# Patient Record
Sex: MALE | Race: WHITE | Employment: OTHER | URBAN - METROPOLITAN AREA
[De-identification: names, ages, dates, MRNs, and addresses within clinical notes are randomized per-mention and may not be internally consistent; named-entity substitution may affect disease eponyms.]

---

## 2021-01-01 ENCOUNTER — APPOINTMENT (OUTPATIENT)
Dept: GENERAL RADIOLOGY | Age: 56
DRG: 917 | End: 2021-01-01
Attending: FAMILY MEDICINE
Payer: MEDICARE

## 2021-01-01 ENCOUNTER — APPOINTMENT (OUTPATIENT)
Dept: CT IMAGING | Age: 56
DRG: 917 | End: 2021-01-01
Attending: FAMILY MEDICINE
Payer: MEDICARE

## 2021-01-01 ENCOUNTER — APPOINTMENT (OUTPATIENT)
Dept: NEUROLOGY | Age: 56
DRG: 917 | End: 2021-01-01
Attending: FAMILY MEDICINE
Payer: MEDICARE

## 2021-01-01 ENCOUNTER — HOSPITAL ENCOUNTER (INPATIENT)
Age: 56
LOS: 2 days | DRG: 917 | End: 2021-05-11
Attending: FAMILY MEDICINE | Admitting: FAMILY MEDICINE
Payer: MEDICARE

## 2021-01-01 ENCOUNTER — APPOINTMENT (OUTPATIENT)
Dept: ULTRASOUND IMAGING | Age: 56
DRG: 917 | End: 2021-01-01
Attending: FAMILY MEDICINE
Payer: MEDICARE

## 2021-01-01 VITALS
WEIGHT: 167.2 LBS | OXYGEN SATURATION: 92 % | SYSTOLIC BLOOD PRESSURE: 116 MMHG | TEMPERATURE: 95.9 F | BODY MASS INDEX: 22.65 KG/M2 | RESPIRATION RATE: 17 BRPM | DIASTOLIC BLOOD PRESSURE: 89 MMHG | HEIGHT: 72 IN | HEART RATE: 121 BPM

## 2021-01-01 DIAGNOSIS — N17.9 ACUTE RENAL FAILURE, UNSPECIFIED ACUTE RENAL FAILURE TYPE (HCC): Primary | ICD-10-CM

## 2021-01-01 LAB
AADO2: 375.8 MMHG
AADO2: 411.6 MMHG
AADO2: 413.4 MMHG
AADO2: 415.3 MMHG
AADO2: 498.3 MMHG
AADO2: 528.3 MMHG
AADO2: 541 MMHG
AADO2: 551.3 MMHG
AADO2: 551.9 MMHG
AADO2: 552.8 MMHG
AADO2: 553 MMHG
AADO2: 554.5 MMHG
AADO2: 561.9 MMHG
ABO/RH: NORMAL
ACANTHOCYTES: ABNORMAL
ACETAMINOPHEN LEVEL: <5 MCG/ML (ref 10–30)
ALBUMIN SERPL-MCNC: 1.7 G/DL (ref 3.5–5.2)
ALBUMIN SERPL-MCNC: 1.8 G/DL (ref 3.5–5.2)
ALBUMIN SERPL-MCNC: 2 G/DL (ref 3.5–5.2)
ALBUMIN SERPL-MCNC: 2.3 G/DL (ref 3.5–5.2)
ALBUMIN SERPL-MCNC: 2.3 G/DL (ref 3.5–5.2)
ALBUMIN SERPL-MCNC: 2.4 G/DL (ref 3.5–5.2)
ALBUMIN SERPL-MCNC: 2.7 G/DL (ref 3.5–5.2)
ALP BLD-CCNC: 111 U/L (ref 40–129)
ALP BLD-CCNC: 152 U/L (ref 40–129)
ALP BLD-CCNC: 155 U/L (ref 40–129)
ALP BLD-CCNC: 224 U/L (ref 40–129)
ALP BLD-CCNC: 276 U/L (ref 40–129)
ALP BLD-CCNC: 331 U/L (ref 40–129)
ALP BLD-CCNC: 387 U/L (ref 40–129)
ALP BLD-CCNC: 79 U/L (ref 40–129)
ALP BLD-CCNC: 92 U/L (ref 40–129)
ALT SERPL-CCNC: 3214 U/L (ref 0–40)
ALT SERPL-CCNC: 4015 U/L (ref 0–40)
ALT SERPL-CCNC: 4023 U/L (ref 0–40)
ALT SERPL-CCNC: 4200 U/L (ref 0–40)
ALT SERPL-CCNC: 4350 U/L (ref 0–40)
ALT SERPL-CCNC: 4616 U/L (ref 0–40)
ALT SERPL-CCNC: 4683 U/L (ref 0–40)
ALT SERPL-CCNC: 4756 U/L (ref 0–40)
ALT SERPL-CCNC: 4771 U/L (ref 0–40)
AMMONIA: 63 UMOL/L (ref 16–60)
ANION GAP SERPL CALCULATED.3IONS-SCNC: 16 MMOL/L (ref 7–16)
ANION GAP SERPL CALCULATED.3IONS-SCNC: 19 MMOL/L (ref 7–16)
ANION GAP SERPL CALCULATED.3IONS-SCNC: 20 MMOL/L (ref 7–16)
ANION GAP SERPL CALCULATED.3IONS-SCNC: 20 MMOL/L (ref 7–16)
ANION GAP SERPL CALCULATED.3IONS-SCNC: 21 MMOL/L (ref 7–16)
ANION GAP SERPL CALCULATED.3IONS-SCNC: 21 MMOL/L (ref 7–16)
ANION GAP SERPL CALCULATED.3IONS-SCNC: 22 MMOL/L (ref 7–16)
ANION GAP SERPL CALCULATED.3IONS-SCNC: 24 MMOL/L (ref 7–16)
ANION GAP SERPL CALCULATED.3IONS-SCNC: 28 MMOL/L (ref 7–16)
ANISOCYTOSIS: ABNORMAL
ANISOCYTOSIS: ABNORMAL
ANTIBODY SCREEN: NORMAL
APTT: 26.7 SEC (ref 24.5–35.1)
AST SERPL-CCNC: 4437 U/L (ref 0–39)
AST SERPL-CCNC: >7000 U/L (ref 0–39)
B.E.: -0.7 MMOL/L (ref -3–3)
B.E.: -10.1 MMOL/L (ref -3–3)
B.E.: -10.8 MMOL/L (ref -3–3)
B.E.: -11.5 MMOL/L (ref -3–3)
B.E.: -11.9 MMOL/L (ref -3–3)
B.E.: -12 MMOL/L (ref -3–3)
B.E.: -12.7 MMOL/L (ref -3–3)
B.E.: -4 MMOL/L (ref -3–3)
B.E.: -4.7 MMOL/L (ref -3–3)
B.E.: -6 MMOL/L (ref -3–3)
B.E.: -6.8 MMOL/L (ref -3–3)
B.E.: -8.2 MMOL/L (ref -3–3)
B.E.: -9.7 MMOL/L (ref -3–3)
BASOPHILS ABSOLUTE: 0 E9/L (ref 0–0.2)
BASOPHILS ABSOLUTE: 0 E9/L (ref 0–0.2)
BASOPHILS ABSOLUTE: 0.01 E9/L (ref 0–0.2)
BASOPHILS RELATIVE PERCENT: 0.1 % (ref 0–2)
BASOPHILS RELATIVE PERCENT: 0.2 % (ref 0–2)
BASOPHILS RELATIVE PERCENT: 0.7 % (ref 0–2)
BILIRUB SERPL-MCNC: 0.3 MG/DL (ref 0–1.2)
BILIRUB SERPL-MCNC: 0.4 MG/DL (ref 0–1.2)
BILIRUB SERPL-MCNC: 0.5 MG/DL (ref 0–1.2)
BILIRUB SERPL-MCNC: 0.7 MG/DL (ref 0–1.2)
BILIRUB SERPL-MCNC: 0.8 MG/DL (ref 0–1.2)
BILIRUB SERPL-MCNC: 1.3 MG/DL (ref 0–1.2)
BILIRUB SERPL-MCNC: 1.4 MG/DL (ref 0–1.2)
BILIRUB SERPL-MCNC: 1.4 MG/DL (ref 0–1.2)
BILIRUB SERPL-MCNC: 1.5 MG/DL (ref 0–1.2)
BUN BLDV-MCNC: 14 MG/DL (ref 6–20)
BUN BLDV-MCNC: 17 MG/DL (ref 6–20)
BUN BLDV-MCNC: 20 MG/DL (ref 6–20)
BUN BLDV-MCNC: 25 MG/DL (ref 6–20)
BUN BLDV-MCNC: 31 MG/DL (ref 6–20)
BUN BLDV-MCNC: 32 MG/DL (ref 6–20)
BUN BLDV-MCNC: 35 MG/DL (ref 6–20)
BUN BLDV-MCNC: 41 MG/DL (ref 6–20)
BUN BLDV-MCNC: 41 MG/DL (ref 6–20)
BURR CELLS: ABNORMAL
CALCIUM IONIZED: 0.84 MMOL/L (ref 1.15–1.33)
CALCIUM IONIZED: 0.85 MMOL/L (ref 1.15–1.33)
CALCIUM IONIZED: 0.85 MMOL/L (ref 1.15–1.33)
CALCIUM IONIZED: 0.97 MMOL/L (ref 1.15–1.33)
CALCIUM IONIZED: 0.98 MMOL/L (ref 1.15–1.33)
CALCIUM IONIZED: 1.1 MMOL/L (ref 1.15–1.33)
CALCIUM IONIZED: 1.23 MMOL/L (ref 1.15–1.33)
CALCIUM IONIZED: 1.26 MMOL/L (ref 1.15–1.33)
CALCIUM SERPL-MCNC: 5.5 MG/DL (ref 8.6–10.2)
CALCIUM SERPL-MCNC: 5.9 MG/DL (ref 8.6–10.2)
CALCIUM SERPL-MCNC: 6.3 MG/DL (ref 8.6–10.2)
CALCIUM SERPL-MCNC: 6.3 MG/DL (ref 8.6–10.2)
CALCIUM SERPL-MCNC: 7.1 MG/DL (ref 8.6–10.2)
CALCIUM SERPL-MCNC: 7.5 MG/DL (ref 8.6–10.2)
CALCIUM SERPL-MCNC: 8.2 MG/DL (ref 8.6–10.2)
CALCIUM SERPL-MCNC: 8.6 MG/DL (ref 8.6–10.2)
CALCIUM SERPL-MCNC: 9 MG/DL (ref 8.6–10.2)
CHLORIDE BLD-SCNC: 104 MMOL/L (ref 98–107)
CHLORIDE BLD-SCNC: 86 MMOL/L (ref 98–107)
CHLORIDE BLD-SCNC: 87 MMOL/L (ref 98–107)
CHLORIDE BLD-SCNC: 89 MMOL/L (ref 98–107)
CHLORIDE BLD-SCNC: 89 MMOL/L (ref 98–107)
CHLORIDE BLD-SCNC: 91 MMOL/L (ref 98–107)
CHLORIDE BLD-SCNC: 92 MMOL/L (ref 98–107)
CHLORIDE BLD-SCNC: 95 MMOL/L (ref 98–107)
CHLORIDE BLD-SCNC: 95 MMOL/L (ref 98–107)
CHLORIDE URINE RANDOM: 40 MMOL/L
CK MB: 266.2 NG/ML (ref 0–7.7)
CO2: 16 MMOL/L (ref 22–29)
CO2: 18 MMOL/L (ref 22–29)
CO2: 19 MMOL/L (ref 22–29)
CO2: 21 MMOL/L (ref 22–29)
COHB: 0.9 % (ref 0–1.5)
COHB: 1.3 % (ref 0–1.5)
COHB: 1.4 % (ref 0–1.5)
COHB: 1.7 % (ref 0–1.5)
COHB: 1.8 % (ref 0–1.5)
COHB: 1.9 % (ref 0–1.5)
COHB: 2.3 % (ref 0–1.5)
COHB: 2.3 % (ref 0–1.5)
COHB: 2.6 % (ref 0–1.5)
COHB: 2.9 % (ref 0–1.5)
COHB: 3 % (ref 0–1.5)
CREAT SERPL-MCNC: 1.7 MG/DL (ref 0.7–1.2)
CREAT SERPL-MCNC: 1.9 MG/DL (ref 0.7–1.2)
CREAT SERPL-MCNC: 2.4 MG/DL (ref 0.7–1.2)
CREAT SERPL-MCNC: 3 MG/DL (ref 0.7–1.2)
CREAT SERPL-MCNC: 3.3 MG/DL (ref 0.7–1.2)
CREAT SERPL-MCNC: 3.3 MG/DL (ref 0.7–1.2)
CREAT SERPL-MCNC: 3.7 MG/DL (ref 0.7–1.2)
CREAT SERPL-MCNC: 3.7 MG/DL (ref 0.7–1.2)
CREAT SERPL-MCNC: 4.2 MG/DL (ref 0.7–1.2)
CREATININE URINE: 131 MG/DL (ref 40–278)
CRITICAL: ABNORMAL
DATE ANALYZED: ABNORMAL
DATE OF COLLECTION: ABNORMAL
EKG ATRIAL RATE: 94 BPM
EKG P AXIS: 78 DEGREES
EKG P-R INTERVAL: 152 MS
EKG Q-T INTERVAL: 356 MS
EKG QRS DURATION: 76 MS
EKG QTC CALCULATION (BAZETT): 445 MS
EKG R AXIS: 70 DEGREES
EKG T AXIS: 68 DEGREES
EKG VENTRICULAR RATE: 94 BPM
EOSINOPHILS ABSOLUTE: 0 E9/L (ref 0.05–0.5)
EOSINOPHILS ABSOLUTE: 0 E9/L (ref 0.05–0.5)
EOSINOPHILS ABSOLUTE: 0.01 E9/L (ref 0.05–0.5)
EOSINOPHILS RELATIVE PERCENT: 0.1 % (ref 0–6)
EOSINOPHILS RELATIVE PERCENT: 0.3 % (ref 0–6)
EOSINOPHILS RELATIVE PERCENT: 0.8 % (ref 0–6)
ETHANOL: <10 MG/DL (ref 0–0.08)
FIO2: 100 %
FIO2: 80 %
GFR AFRICAN AMERICAN: 18
GFR AFRICAN AMERICAN: 21
GFR AFRICAN AMERICAN: 21
GFR AFRICAN AMERICAN: 23
GFR AFRICAN AMERICAN: 23
GFR AFRICAN AMERICAN: 26
GFR AFRICAN AMERICAN: 34
GFR AFRICAN AMERICAN: 44
GFR AFRICAN AMERICAN: 50
GFR NON-AFRICAN AMERICAN: 15 ML/MIN/1.73
GFR NON-AFRICAN AMERICAN: 17 ML/MIN/1.73
GFR NON-AFRICAN AMERICAN: 17 ML/MIN/1.73
GFR NON-AFRICAN AMERICAN: 19 ML/MIN/1.73
GFR NON-AFRICAN AMERICAN: 19 ML/MIN/1.73
GFR NON-AFRICAN AMERICAN: 22 ML/MIN/1.73
GFR NON-AFRICAN AMERICAN: 28 ML/MIN/1.73
GFR NON-AFRICAN AMERICAN: 37 ML/MIN/1.73
GFR NON-AFRICAN AMERICAN: 42 ML/MIN/1.73
GLUCOSE BLD-MCNC: 193 MG/DL (ref 74–99)
GLUCOSE BLD-MCNC: 202 MG/DL (ref 74–99)
GLUCOSE BLD-MCNC: 220 MG/DL (ref 74–99)
GLUCOSE BLD-MCNC: 262 MG/DL (ref 74–99)
GLUCOSE BLD-MCNC: 268 MG/DL (ref 74–99)
GLUCOSE BLD-MCNC: 283 MG/DL (ref 74–99)
GLUCOSE BLD-MCNC: 285 MG/DL (ref 74–99)
GLUCOSE BLD-MCNC: 287 MG/DL (ref 74–99)
GLUCOSE BLD-MCNC: 291 MG/DL (ref 74–99)
GLUCOSE BLD-MCNC: 298 MG/DL (ref 74–99)
GLUCOSE BLD-MCNC: 300 MG/DL (ref 74–99)
GLUCOSE BLD-MCNC: 305 MG/DL (ref 74–99)
GLUCOSE BLD-MCNC: 357 MG/DL (ref 74–99)
HBA1C MFR BLD: 6.2 % (ref 4–5.6)
HCO3: 16.8 MMOL/L (ref 22–26)
HCO3: 16.9 MMOL/L (ref 22–26)
HCO3: 17.5 MMOL/L (ref 22–26)
HCO3: 17.8 MMOL/L (ref 22–26)
HCO3: 18.6 MMOL/L (ref 22–26)
HCO3: 19.5 MMOL/L (ref 22–26)
HCO3: 20.7 MMOL/L (ref 22–26)
HCO3: 20.8 MMOL/L (ref 22–26)
HCO3: 21.4 MMOL/L (ref 22–26)
HCO3: 22.1 MMOL/L (ref 22–26)
HCO3: 23 MMOL/L (ref 22–26)
HCO3: 24.2 MMOL/L (ref 22–26)
HCO3: 25.1 MMOL/L (ref 22–26)
HCT VFR BLD CALC: 37.2 % (ref 37–54)
HCT VFR BLD CALC: 41.7 % (ref 37–54)
HCT VFR BLD CALC: 44.4 % (ref 37–54)
HEMOGLOBIN: 12.3 G/DL (ref 12.5–16.5)
HEMOGLOBIN: 13.8 G/DL (ref 12.5–16.5)
HEMOGLOBIN: 14.3 G/DL (ref 12.5–16.5)
HHB: 0.6 % (ref 0–5)
HHB: 1.4 % (ref 0–5)
HHB: 12.4 % (ref 0–5)
HHB: 13 % (ref 0–5)
HHB: 3.8 % (ref 0–5)
HHB: 3.9 % (ref 0–5)
HHB: 5.2 % (ref 0–5)
HHB: 5.3 % (ref 0–5)
HHB: 6.3 % (ref 0–5)
HHB: 6.8 % (ref 0–5)
HHB: 7.8 % (ref 0–5)
HHB: 8.2 % (ref 0–5)
HHB: 8.3 % (ref 0–5)
HYPOCHROMIA: ABNORMAL
IMMATURE GRANULOCYTES #: 0.13 E9/L
IMMATURE GRANULOCYTES %: 1.2 % (ref 0–5)
INR BLD: 1.4
INR BLD: 2.6
INR BLD: 3.4
LAB: ABNORMAL
LACTIC ACID: 12.7 MMOL/L (ref 0.5–2.2)
LACTIC ACID: 5.6 MMOL/L (ref 0.5–2.2)
LACTIC ACID: 7.6 MMOL/L (ref 0.5–2.2)
LACTIC ACID: 8 MMOL/L (ref 0.5–2.2)
LACTIC ACID: 8.9 MMOL/L (ref 0.5–2.2)
LYMPHOCYTES ABSOLUTE: 0.38 E9/L (ref 1.5–4)
LYMPHOCYTES ABSOLUTE: 0.89 E9/L (ref 1.5–4)
LYMPHOCYTES ABSOLUTE: 0.92 E9/L (ref 1.5–4)
LYMPHOCYTES RELATIVE PERCENT: 1.8 % (ref 20–42)
LYMPHOCYTES RELATIVE PERCENT: 3.5 % (ref 20–42)
LYMPHOCYTES RELATIVE PERCENT: 8.2 % (ref 20–42)
Lab: ABNORMAL
MAGNESIUM: 1.5 MG/DL (ref 1.6–2.6)
MAGNESIUM: 1.7 MG/DL (ref 1.6–2.6)
MAGNESIUM: 1.8 MG/DL (ref 1.6–2.6)
MAGNESIUM: 1.8 MG/DL (ref 1.6–2.6)
MAGNESIUM: 1.9 MG/DL (ref 1.6–2.6)
MAGNESIUM: 1.9 MG/DL (ref 1.6–2.6)
MAGNESIUM: 2.2 MG/DL (ref 1.6–2.6)
MCH RBC QN AUTO: 32.7 PG (ref 26–35)
MCH RBC QN AUTO: 32.9 PG (ref 26–35)
MCH RBC QN AUTO: 33.4 PG (ref 26–35)
MCHC RBC AUTO-ENTMCNC: 32.2 % (ref 32–34.5)
MCHC RBC AUTO-ENTMCNC: 33.1 % (ref 32–34.5)
MCHC RBC AUTO-ENTMCNC: 33.1 % (ref 32–34.5)
MCV RBC AUTO: 101.1 FL (ref 80–99.9)
MCV RBC AUTO: 101.6 FL (ref 80–99.9)
MCV RBC AUTO: 99.5 FL (ref 80–99.9)
METAMYELOCYTES RELATIVE PERCENT: 11.4 % (ref 0–1)
METAMYELOCYTES RELATIVE PERCENT: 7.8 % (ref 0–1)
METER GLUCOSE: 181 MG/DL (ref 74–99)
METER GLUCOSE: 183 MG/DL (ref 74–99)
METER GLUCOSE: 194 MG/DL (ref 74–99)
METER GLUCOSE: 204 MG/DL (ref 74–99)
METER GLUCOSE: 219 MG/DL (ref 74–99)
METER GLUCOSE: 234 MG/DL (ref 74–99)
METER GLUCOSE: 240 MG/DL (ref 74–99)
METER GLUCOSE: 242 MG/DL (ref 74–99)
METHB: 0 % (ref 0–1.5)
METHB: 0.2 % (ref 0–1.5)
METHB: 0.3 % (ref 0–1.5)
MODE: ABNORMAL
MODE: AC
MONOCYTES ABSOLUTE: 0 E9/L (ref 0.1–0.95)
MONOCYTES ABSOLUTE: 0.33 E9/L (ref 0.1–0.95)
MONOCYTES ABSOLUTE: 1.13 E9/L (ref 0.1–0.95)
MONOCYTES RELATIVE PERCENT: 1.6 % (ref 2–12)
MONOCYTES RELATIVE PERCENT: 3 % (ref 2–12)
MONOCYTES RELATIVE PERCENT: 6.1 % (ref 2–12)
MYELOCYTE PERCENT: 1.7 % (ref 0–0)
NEUTROPHILS ABSOLUTE: 17.39 E9/L (ref 1.8–7.3)
NEUTROPHILS ABSOLUTE: 22.41 E9/L (ref 1.8–7.3)
NEUTROPHILS ABSOLUTE: 9.51 E9/L (ref 1.8–7.3)
NEUTROPHILS RELATIVE PERCENT: 80.7 % (ref 43–80)
NEUTROPHILS RELATIVE PERCENT: 87 % (ref 43–80)
NEUTROPHILS RELATIVE PERCENT: 87.4 % (ref 43–80)
NUCLEATED RED BLOOD CELLS: 2.6 /100 WBC
NUCLEATED RED BLOOD CELLS: 4.4 /100 WBC
O2 CONTENT: 14.5 ML/DL
O2 CONTENT: 14.6 ML/DL
O2 CONTENT: 15.6 ML/DL
O2 CONTENT: 15.7 ML/DL
O2 CONTENT: 15.9 ML/DL
O2 CONTENT: 16.1 ML/DL
O2 CONTENT: 16.7 ML/DL
O2 CONTENT: 17 ML/DL
O2 CONTENT: 17.2 ML/DL
O2 CONTENT: 17.2 ML/DL
O2 CONTENT: 18.4 ML/DL
O2 CONTENT: 19.3 ML/DL
O2 CONTENT: 20.2 ML/DL
O2 SATURATION: 86.8 % (ref 92–98.5)
O2 SATURATION: 87.4 % (ref 92–98.5)
O2 SATURATION: 91.4 % (ref 92–98.5)
O2 SATURATION: 91.6 % (ref 92–98.5)
O2 SATURATION: 92 % (ref 92–98.5)
O2 SATURATION: 93 % (ref 92–98.5)
O2 SATURATION: 93.5 % (ref 92–98.5)
O2 SATURATION: 94.6 % (ref 92–98.5)
O2 SATURATION: 94.7 % (ref 92–98.5)
O2 SATURATION: 96 % (ref 92–98.5)
O2 SATURATION: 96.1 % (ref 92–98.5)
O2 SATURATION: 98.6 % (ref 92–98.5)
O2 SATURATION: 99.4 % (ref 92–98.5)
O2HB: 85.3 % (ref 94–97)
O2HB: 85.9 % (ref 94–97)
O2HB: 88.4 % (ref 94–97)
O2HB: 89.1 % (ref 94–97)
O2HB: 89.2 % (ref 94–97)
O2HB: 90.3 % (ref 94–97)
O2HB: 90.6 % (ref 94–97)
O2HB: 92.1 % (ref 94–97)
O2HB: 93 % (ref 94–97)
O2HB: 93.3 % (ref 94–97)
O2HB: 94.1 % (ref 94–97)
O2HB: 96.5 % (ref 94–97)
O2HB: 98.2 % (ref 94–97)
OPERATOR ID: 1210
OPERATOR ID: 1394
OPERATOR ID: 2420
OPERATOR ID: 2962
OPERATOR ID: 359
OPERATOR ID: 5100
OPERATOR ID: 5100
OPERATOR ID: 753
OPERATOR ID: ABNORMAL
OSMOLALITY URINE: 314 MOSM/KG (ref 300–900)
OSMOLALITY: 311 MOSM/KG (ref 285–310)
OVALOCYTES: ABNORMAL
PATIENT TEMP: 37 C
PCO2: 45.7 MMHG (ref 35–45)
PCO2: 47.7 MMHG (ref 35–45)
PCO2: 48.9 MMHG (ref 35–45)
PCO2: 52.5 MMHG (ref 35–45)
PCO2: 52.9 MMHG (ref 35–45)
PCO2: 53.6 MMHG (ref 35–45)
PCO2: 54.9 MMHG (ref 35–45)
PCO2: 57 MMHG (ref 35–45)
PCO2: 57 MMHG (ref 35–45)
PCO2: 59.2 MMHG (ref 35–45)
PCO2: 63.5 MMHG (ref 35–45)
PCO2: 64.2 MMHG (ref 35–45)
PCO2: 68.1 MMHG (ref 35–45)
PDW BLD-RTO: 17.2 FL (ref 11.5–15)
PDW BLD-RTO: 17.2 FL (ref 11.5–15)
PDW BLD-RTO: 17.5 FL (ref 11.5–15)
PEEP/CPAP: 5 CMH2O
PEEP/CPAP: 5 CMH2O
PEEP/CPAP: 8 CMH2O
PFO2: 0.73 MMHG/%
PFO2: 0.76 MMHG/%
PFO2: 0.77 MMHG/%
PFO2: 0.78 MMHG/%
PFO2: 0.82 MMHG/%
PFO2: 0.82 MMHG/%
PFO2: 0.86 MMHG/%
PFO2: 0.87 MMHG/%
PFO2: 0.92 MMHG/%
PFO2: 0.95 MMHG/%
PFO2: 1.05 MMHG/%
PFO2: 1.41 MMHG/%
PFO2: 2.66 MMHG/%
PH BLOOD GAS: 7.1 (ref 7.35–7.45)
PH BLOOD GAS: 7.11 (ref 7.35–7.45)
PH BLOOD GAS: 7.12 (ref 7.35–7.45)
PH BLOOD GAS: 7.14 (ref 7.35–7.45)
PH BLOOD GAS: 7.17 (ref 7.35–7.45)
PH BLOOD GAS: 7.17 (ref 7.35–7.45)
PH BLOOD GAS: 7.22 (ref 7.35–7.45)
PH BLOOD GAS: 7.23 (ref 7.35–7.45)
PH BLOOD GAS: 7.25 (ref 7.35–7.45)
PH BLOOD GAS: 7.26 (ref 7.35–7.45)
PH BLOOD GAS: 7.36 (ref 7.35–7.45)
PHOSPHORUS: 5.3 MG/DL (ref 2.5–4.5)
PHOSPHORUS: 5.6 MG/DL (ref 2.5–4.5)
PHOSPHORUS: 6.3 MG/DL (ref 2.5–4.5)
PHOSPHORUS: 6.8 MG/DL (ref 2.5–4.5)
PHOSPHORUS: 6.8 MG/DL (ref 2.5–4.5)
PHOSPHORUS: 7.1 MG/DL (ref 2.5–4.5)
PHOSPHORUS: 7.2 MG/DL (ref 2.5–4.5)
PHOSPHORUS: 8.4 MG/DL (ref 2.5–4.5)
PLATELET # BLD: 186 E9/L (ref 130–450)
PLATELET # BLD: 35 E9/L (ref 130–450)
PLATELET # BLD: 94 E9/L (ref 130–450)
PLATELET CONFIRMATION: NORMAL
PLATELET CONFIRMATION: NORMAL
PMV BLD AUTO: 10.4 FL (ref 7–12)
PMV BLD AUTO: 11 FL (ref 7–12)
PMV BLD AUTO: 11.8 FL (ref 7–12)
PO2: 140.8 MMHG (ref 75–100)
PO2: 266.5 MMHG (ref 75–100)
PO2: 65.5 MMHG (ref 75–100)
PO2: 68.4 MMHG (ref 75–100)
PO2: 73.2 MMHG (ref 75–100)
PO2: 76.5 MMHG (ref 75–100)
PO2: 77.5 MMHG (ref 75–100)
PO2: 78.2 MMHG (ref 75–100)
PO2: 82.5 MMHG (ref 75–100)
PO2: 83.7 MMHG (ref 75–100)
PO2: 87.3 MMHG (ref 75–100)
PO2: 92.1 MMHG (ref 75–100)
PO2: 95.5 MMHG (ref 75–100)
POIKILOCYTES: ABNORMAL
POIKILOCYTES: ABNORMAL
POLYCHROMASIA: ABNORMAL
POTASSIUM SERPL-SCNC: 3.6 MMOL/L (ref 3.5–5)
POTASSIUM SERPL-SCNC: 4.1 MMOL/L (ref 3.5–5)
POTASSIUM SERPL-SCNC: 4.2 MMOL/L (ref 3.5–5)
POTASSIUM SERPL-SCNC: 4.3 MMOL/L (ref 3.5–5)
POTASSIUM SERPL-SCNC: 4.4 MMOL/L (ref 3.5–5)
POTASSIUM SERPL-SCNC: 4.4 MMOL/L (ref 3.5–5)
POTASSIUM SERPL-SCNC: 4.5 MMOL/L (ref 3.5–5)
POTASSIUM SERPL-SCNC: 4.6 MMOL/L (ref 3.5–5)
POTASSIUM SERPL-SCNC: 4.7 MMOL/L (ref 3.5–5)
POTASSIUM SERPL-SCNC: 5.1 MMOL/L (ref 3.5–5)
POTASSIUM SERPL-SCNC: 6.11 MMOL/L (ref 3.5–5)
POTASSIUM SERPL-SCNC: 7.4 MMOL/L (ref 3.5–5)
POTASSIUM, UR: 47.2 MMOL/L
PRO-BNP: 4494 PG/ML (ref 0–125)
PROCALCITONIN: 80.97 NG/ML (ref 0–0.08)
PROTHROMBIN TIME: 15.6 SEC (ref 9.3–12.4)
PROTHROMBIN TIME: 28.3 SEC (ref 9.3–12.4)
PROTHROMBIN TIME: 37.4 SEC (ref 9.3–12.4)
RBC # BLD: 3.68 E12/L (ref 3.8–5.8)
RBC # BLD: 4.19 E12/L (ref 3.8–5.8)
RBC # BLD: 4.37 E12/L (ref 3.8–5.8)
RI(T): 1.41
RI(T): 3.54
RI(T): 4.92
RI(T): 5.53
RI(T): 5.87
RI(T): 6.07
RI(T): 6.31
RI(T): 6.33
RI(T): 6.69
RI(T): 7.07
RI(T): 7.11
RI(T): 7.25
RI(T): 7.68
RR MECHANICAL: 16 B/MIN
RR MECHANICAL: 20 B/MIN
RR MECHANICAL: 22 B/MIN
RR MECHANICAL: 22 B/MIN
RR MECHANICAL: 24 B/MIN
RR MECHANICAL: 28 B/MIN
SALICYLATE, SERUM: 0.7 MG/DL (ref 0–30)
SODIUM BLD-SCNC: 126 MMOL/L (ref 132–146)
SODIUM BLD-SCNC: 128 MMOL/L (ref 132–146)
SODIUM BLD-SCNC: 131 MMOL/L (ref 132–146)
SODIUM BLD-SCNC: 136 MMOL/L (ref 132–146)
SODIUM BLD-SCNC: 137 MMOL/L (ref 132–146)
SODIUM BLD-SCNC: 138 MMOL/L (ref 132–146)
SODIUM URINE: 63 MMOL/L
SOURCE, BLOOD GAS: ABNORMAL
T4 FREE: 0.91 NG/DL (ref 0.93–1.7)
TARGET CELLS: ABNORMAL
THB: 11.4 G/DL (ref 11.5–16.5)
THB: 11.5 G/DL (ref 11.5–16.5)
THB: 11.9 G/DL (ref 11.5–16.5)
THB: 12.5 G/DL (ref 11.5–16.5)
THB: 12.8 G/DL (ref 11.5–16.5)
THB: 12.8 G/DL (ref 11.5–16.5)
THB: 12.9 G/DL (ref 11.5–16.5)
THB: 12.9 G/DL (ref 11.5–16.5)
THB: 13.2 G/DL (ref 11.5–16.5)
THB: 13.3 G/DL (ref 11.5–16.5)
THB: 14.2 G/DL (ref 11.5–16.5)
THB: 14.7 G/DL (ref 11.5–16.5)
THB: 14.7 G/DL (ref 11.5–16.5)
TIME ANALYZED: 1026
TIME ANALYZED: 1039
TIME ANALYZED: 1328
TIME ANALYZED: 1343
TIME ANALYZED: 1612
TIME ANALYZED: 1843
TIME ANALYZED: 2011
TIME ANALYZED: 2212
TIME ANALYZED: 413
TIME ANALYZED: 454
TIME ANALYZED: 538
TIME ANALYZED: 855
TIME ANALYZED: 955
TOTAL CK: ABNORMAL U/L (ref 20–200)
TOTAL PROTEIN: 3.7 G/DL (ref 6.4–8.3)
TOTAL PROTEIN: 3.8 G/DL (ref 6.4–8.3)
TOTAL PROTEIN: 3.9 G/DL (ref 6.4–8.3)
TOTAL PROTEIN: 4.2 G/DL (ref 6.4–8.3)
TOTAL PROTEIN: 4.4 G/DL (ref 6.4–8.3)
TOTAL PROTEIN: 4.4 G/DL (ref 6.4–8.3)
TOTAL PROTEIN: 4.6 G/DL (ref 6.4–8.3)
TOTAL PROTEIN: 4.7 G/DL (ref 6.4–8.3)
TOTAL PROTEIN: 5 G/DL (ref 6.4–8.3)
TRICYCLIC ANTIDEPRESSANTS SCREEN SERUM: NEGATIVE NG/ML
TROPONIN: 0.31 NG/ML (ref 0–0.03)
TROPONIN: 0.63 NG/ML (ref 0–0.03)
TSH SERPL DL<=0.05 MIU/L-ACNC: 5.1 UIU/ML (ref 0.27–4.2)
UREA NITROGEN, UR: 74 MG/DL (ref 800–1666)
URINE CULTURE, ROUTINE: NORMAL
VANCOMYCIN RANDOM: 24.7 MCG/ML (ref 5–40)
VT MECHANICAL: 400 ML
VT MECHANICAL: 450 ML
VT MECHANICAL: 550 ML
VT MECHANICAL: 600 ML
VT MECHANICAL: 620 ML
WBC # BLD: 10.9 E9/L (ref 4.5–11.5)
WBC # BLD: 18.9 E9/L (ref 4.5–11.5)
WBC # BLD: 23.1 E9/L (ref 4.5–11.5)

## 2021-01-01 PROCEDURE — 2580000003 HC RX 258: Performed by: FAMILY MEDICINE

## 2021-01-01 PROCEDURE — 2500000003 HC RX 250 WO HCPCS: Performed by: INTERNAL MEDICINE

## 2021-01-01 PROCEDURE — 82077 ASSAY SPEC XCP UR&BREATH IA: CPT

## 2021-01-01 PROCEDURE — 80053 COMPREHEN METABOLIC PANEL: CPT

## 2021-01-01 PROCEDURE — 2580000003 HC RX 258: Performed by: INTERNAL MEDICINE

## 2021-01-01 PROCEDURE — 6370000000 HC RX 637 (ALT 250 FOR IP): Performed by: INTERNAL MEDICINE

## 2021-01-01 PROCEDURE — 06HY33Z INSERTION OF INFUSION DEVICE INTO LOWER VEIN, PERCUTANEOUS APPROACH: ICD-10-PCS | Performed by: INTERNAL MEDICINE

## 2021-01-01 PROCEDURE — 36620 INSERTION CATHETER ARTERY: CPT

## 2021-01-01 PROCEDURE — 82805 BLOOD GASES W/O2 SATURATION: CPT

## 2021-01-01 PROCEDURE — 82550 ASSAY OF CK (CPK): CPT

## 2021-01-01 PROCEDURE — 83735 ASSAY OF MAGNESIUM: CPT

## 2021-01-01 PROCEDURE — 87147 CULTURE TYPE IMMUNOLOGIC: CPT

## 2021-01-01 PROCEDURE — 2000000000 HC ICU R&B

## 2021-01-01 PROCEDURE — C9113 INJ PANTOPRAZOLE SODIUM, VIA: HCPCS | Performed by: INTERNAL MEDICINE

## 2021-01-01 PROCEDURE — 37799 UNLISTED PX VASCULAR SURGERY: CPT

## 2021-01-01 PROCEDURE — 95822 EEG COMA OR SLEEP ONLY: CPT | Performed by: PSYCHIATRY & NEUROLOGY

## 2021-01-01 PROCEDURE — 83935 ASSAY OF URINE OSMOLALITY: CPT

## 2021-01-01 PROCEDURE — 6360000002 HC RX W HCPCS: Performed by: INTERNAL MEDICINE

## 2021-01-01 PROCEDURE — 83605 ASSAY OF LACTIC ACID: CPT

## 2021-01-01 PROCEDURE — 82436 ASSAY OF URINE CHLORIDE: CPT

## 2021-01-01 PROCEDURE — 80143 DRUG ASSAY ACETAMINOPHEN: CPT

## 2021-01-01 PROCEDURE — 93005 ELECTROCARDIOGRAM TRACING: CPT | Performed by: INTERNAL MEDICINE

## 2021-01-01 PROCEDURE — 82962 GLUCOSE BLOOD TEST: CPT

## 2021-01-01 PROCEDURE — 36415 COLL VENOUS BLD VENIPUNCTURE: CPT

## 2021-01-01 PROCEDURE — 2500000003 HC RX 250 WO HCPCS: Performed by: FAMILY MEDICINE

## 2021-01-01 PROCEDURE — 0BH17EZ INSERTION OF ENDOTRACHEAL AIRWAY INTO TRACHEA, VIA NATURAL OR ARTIFICIAL OPENING: ICD-10-PCS | Performed by: FAMILY MEDICINE

## 2021-01-01 PROCEDURE — 84100 ASSAY OF PHOSPHORUS: CPT

## 2021-01-01 PROCEDURE — 99222 1ST HOSP IP/OBS MODERATE 55: CPT | Performed by: CLINICAL NURSE SPECIALIST

## 2021-01-01 PROCEDURE — 82553 CREATINE MB FRACTION: CPT

## 2021-01-01 PROCEDURE — 80307 DRUG TEST PRSMV CHEM ANLYZR: CPT

## 2021-01-01 PROCEDURE — 36556 INSERT NON-TUNNEL CV CATH: CPT

## 2021-01-01 PROCEDURE — 85025 COMPLETE CBC W/AUTO DIFF WBC: CPT

## 2021-01-01 PROCEDURE — 76770 US EXAM ABDO BACK WALL COMP: CPT

## 2021-01-01 PROCEDURE — 83880 ASSAY OF NATRIURETIC PEPTIDE: CPT

## 2021-01-01 PROCEDURE — 99233 SBSQ HOSP IP/OBS HIGH 50: CPT | Performed by: CLINICAL NURSE SPECIALIST

## 2021-01-01 PROCEDURE — 94002 VENT MGMT INPAT INIT DAY: CPT

## 2021-01-01 PROCEDURE — 90945 DIALYSIS ONE EVALUATION: CPT

## 2021-01-01 PROCEDURE — 82947 ASSAY GLUCOSE BLOOD QUANT: CPT

## 2021-01-01 PROCEDURE — 82140 ASSAY OF AMMONIA: CPT

## 2021-01-01 PROCEDURE — 99223 1ST HOSP IP/OBS HIGH 75: CPT | Performed by: INTERNAL MEDICINE

## 2021-01-01 PROCEDURE — 6370000000 HC RX 637 (ALT 250 FOR IP): Performed by: FAMILY MEDICINE

## 2021-01-01 PROCEDURE — 6360000002 HC RX W HCPCS

## 2021-01-01 PROCEDURE — 84133 ASSAY OF URINE POTASSIUM: CPT

## 2021-01-01 PROCEDURE — 85730 THROMBOPLASTIN TIME PARTIAL: CPT

## 2021-01-01 PROCEDURE — 51702 INSERT TEMP BLADDER CATH: CPT

## 2021-01-01 PROCEDURE — 86850 RBC ANTIBODY SCREEN: CPT

## 2021-01-01 PROCEDURE — 93010 ELECTROCARDIOGRAM REPORT: CPT | Performed by: INTERNAL MEDICINE

## 2021-01-01 PROCEDURE — 6360000002 HC RX W HCPCS: Performed by: FAMILY MEDICINE

## 2021-01-01 PROCEDURE — 2500000003 HC RX 250 WO HCPCS

## 2021-01-01 PROCEDURE — 85610 PROTHROMBIN TIME: CPT

## 2021-01-01 PROCEDURE — 82330 ASSAY OF CALCIUM: CPT

## 2021-01-01 PROCEDURE — 99232 SBSQ HOSP IP/OBS MODERATE 35: CPT | Performed by: FAMILY MEDICINE

## 2021-01-01 PROCEDURE — 84443 ASSAY THYROID STIM HORMONE: CPT

## 2021-01-01 PROCEDURE — 87070 CULTURE OTHR SPECIMN AEROBIC: CPT

## 2021-01-01 PROCEDURE — 87088 URINE BACTERIA CULTURE: CPT

## 2021-01-01 PROCEDURE — 84300 ASSAY OF URINE SODIUM: CPT

## 2021-01-01 PROCEDURE — 5A1945Z RESPIRATORY VENTILATION, 24-96 CONSECUTIVE HOURS: ICD-10-PCS | Performed by: FAMILY MEDICINE

## 2021-01-01 PROCEDURE — 83930 ASSAY OF BLOOD OSMOLALITY: CPT

## 2021-01-01 PROCEDURE — 94003 VENT MGMT INPAT SUBQ DAY: CPT

## 2021-01-01 PROCEDURE — 71045 X-RAY EXAM CHEST 1 VIEW: CPT

## 2021-01-01 PROCEDURE — 2580000003 HC RX 258

## 2021-01-01 PROCEDURE — 36620 INSERTION CATHETER ARTERY: CPT | Performed by: INTERNAL MEDICINE

## 2021-01-01 PROCEDURE — 99221 1ST HOSP IP/OBS SF/LOW 40: CPT | Performed by: PSYCHIATRY & NEUROLOGY

## 2021-01-01 PROCEDURE — 74018 RADEX ABDOMEN 1 VIEW: CPT

## 2021-01-01 PROCEDURE — 87206 SMEAR FLUORESCENT/ACID STAI: CPT

## 2021-01-01 PROCEDURE — 84540 ASSAY OF URINE/UREA-N: CPT

## 2021-01-01 PROCEDURE — 86900 BLOOD TYPING SEROLOGIC ABO: CPT

## 2021-01-01 PROCEDURE — 70490 CT SOFT TISSUE NECK W/O DYE: CPT

## 2021-01-01 PROCEDURE — 84145 PROCALCITONIN (PCT): CPT

## 2021-01-01 PROCEDURE — 84132 ASSAY OF SERUM POTASSIUM: CPT

## 2021-01-01 PROCEDURE — 95822 EEG COMA OR SLEEP ONLY: CPT

## 2021-01-01 PROCEDURE — 80202 ASSAY OF VANCOMYCIN: CPT

## 2021-01-01 PROCEDURE — 84484 ASSAY OF TROPONIN QUANT: CPT

## 2021-01-01 PROCEDURE — 83036 HEMOGLOBIN GLYCOSYLATED A1C: CPT

## 2021-01-01 PROCEDURE — 82570 ASSAY OF URINE CREATININE: CPT

## 2021-01-01 PROCEDURE — 99232 SBSQ HOSP IP/OBS MODERATE 35: CPT | Performed by: CLINICAL NURSE SPECIALIST

## 2021-01-01 PROCEDURE — 80179 DRUG ASSAY SALICYLATE: CPT

## 2021-01-01 PROCEDURE — 84439 ASSAY OF FREE THYROXINE: CPT

## 2021-01-01 PROCEDURE — 87040 BLOOD CULTURE FOR BACTERIA: CPT

## 2021-01-01 PROCEDURE — 36556 INSERT NON-TUNNEL CV CATH: CPT | Performed by: INTERNAL MEDICINE

## 2021-01-01 PROCEDURE — 86901 BLOOD TYPING SEROLOGIC RH(D): CPT

## 2021-01-01 PROCEDURE — 70450 CT HEAD/BRAIN W/O DYE: CPT

## 2021-01-01 RX ORDER — CHLORHEXIDINE GLUCONATE 0.12 MG/ML
15 RINSE ORAL 2 TIMES DAILY
Status: DISCONTINUED | OUTPATIENT
Start: 2021-01-01 | End: 2021-01-01

## 2021-01-01 RX ORDER — PANTOPRAZOLE SODIUM 40 MG/10ML
40 INJECTION, POWDER, LYOPHILIZED, FOR SOLUTION INTRAVENOUS 2 TIMES DAILY
Status: DISCONTINUED | OUTPATIENT
Start: 2021-01-01 | End: 2021-01-01

## 2021-01-01 RX ORDER — PROMETHAZINE HYDROCHLORIDE 25 MG/1
12.5 TABLET ORAL EVERY 6 HOURS PRN
Status: DISCONTINUED | OUTPATIENT
Start: 2021-01-01 | End: 2021-01-01

## 2021-01-01 RX ORDER — THIAMINE HYDROCHLORIDE 100 MG/ML
500 INJECTION, SOLUTION INTRAMUSCULAR; INTRAVENOUS DAILY
Status: DISCONTINUED | OUTPATIENT
Start: 2021-01-01 | End: 2021-01-01 | Stop reason: SDUPTHER

## 2021-01-01 RX ORDER — HEPARIN SODIUM 1000 [USP'U]/ML
INJECTION, SOLUTION INTRAVENOUS; SUBCUTANEOUS
Status: COMPLETED
Start: 2021-01-01 | End: 2021-01-01

## 2021-01-01 RX ORDER — PHENYLEPHRINE HYDROCHLORIDE 10 MG/ML
INJECTION INTRAVENOUS
Status: COMPLETED
Start: 2021-01-01 | End: 2021-01-01

## 2021-01-01 RX ORDER — ACETAMINOPHEN 325 MG/1
650 TABLET ORAL EVERY 6 HOURS PRN
Status: DISCONTINUED | OUTPATIENT
Start: 2021-01-01 | End: 2021-01-01

## 2021-01-01 RX ORDER — PANTOPRAZOLE SODIUM 40 MG/10ML
40 INJECTION, POWDER, LYOPHILIZED, FOR SOLUTION INTRAVENOUS DAILY
Status: DISCONTINUED | OUTPATIENT
Start: 2021-01-01 | End: 2021-01-01

## 2021-01-01 RX ORDER — SODIUM CHLORIDE 9 MG/ML
10 INJECTION INTRAVENOUS DAILY
Status: DISCONTINUED | OUTPATIENT
Start: 2021-01-01 | End: 2021-01-01

## 2021-01-01 RX ORDER — LACTULOSE 10 G/15ML
20 SOLUTION ORAL 3 TIMES DAILY
Status: DISCONTINUED | OUTPATIENT
Start: 2021-01-01 | End: 2021-01-01

## 2021-01-01 RX ORDER — DEXTROSE MONOHYDRATE 50 MG/ML
100 INJECTION, SOLUTION INTRAVENOUS PRN
Status: DISCONTINUED | OUTPATIENT
Start: 2021-01-01 | End: 2021-01-01

## 2021-01-01 RX ORDER — SODIUM CHLORIDE 0.9 % (FLUSH) 0.9 %
5-40 SYRINGE (ML) INJECTION EVERY 12 HOURS SCHEDULED
Status: DISCONTINUED | OUTPATIENT
Start: 2021-01-01 | End: 2021-01-01

## 2021-01-01 RX ORDER — GLYCOPYRROLATE 0.2 MG/ML
0.4 INJECTION INTRAMUSCULAR; INTRAVENOUS EVERY 4 HOURS PRN
Status: DISCONTINUED | OUTPATIENT
Start: 2021-01-01 | End: 2021-05-12 | Stop reason: HOSPADM

## 2021-01-01 RX ORDER — THIAMINE HYDROCHLORIDE 100 MG/ML
100 INJECTION, SOLUTION INTRAMUSCULAR; INTRAVENOUS DAILY
Status: DISCONTINUED | OUTPATIENT
Start: 2021-01-01 | End: 2021-01-01

## 2021-01-01 RX ORDER — HEPARIN SODIUM 10000 [USP'U]/ML
5000 INJECTION, SOLUTION INTRAVENOUS; SUBCUTANEOUS EVERY 8 HOURS
Status: DISCONTINUED | OUTPATIENT
Start: 2021-01-01 | End: 2021-01-01

## 2021-01-01 RX ORDER — LORAZEPAM 2 MG/ML
0.5 INJECTION INTRAMUSCULAR
Status: DISCONTINUED | OUTPATIENT
Start: 2021-01-01 | End: 2021-05-12 | Stop reason: HOSPADM

## 2021-01-01 RX ORDER — POTASSIUM CHLORIDE 29.8 MG/ML
20 INJECTION INTRAVENOUS PRN
Status: DISCONTINUED | OUTPATIENT
Start: 2021-01-01 | End: 2021-01-01

## 2021-01-01 RX ORDER — NICOTINE POLACRILEX 4 MG
15 LOZENGE BUCCAL PRN
Status: DISCONTINUED | OUTPATIENT
Start: 2021-01-01 | End: 2021-01-01

## 2021-01-01 RX ORDER — DEXTROSE MONOHYDRATE 50 MG/ML
INJECTION, SOLUTION INTRAVENOUS
Status: COMPLETED
Start: 2021-01-01 | End: 2021-01-01

## 2021-01-01 RX ORDER — ACETAMINOPHEN 650 MG/1
650 SUPPOSITORY RECTAL EVERY 6 HOURS PRN
Status: DISCONTINUED | OUTPATIENT
Start: 2021-01-01 | End: 2021-01-01

## 2021-01-01 RX ORDER — IPRATROPIUM BROMIDE AND ALBUTEROL SULFATE 2.5; .5 MG/3ML; MG/3ML
1 SOLUTION RESPIRATORY (INHALATION) EVERY 4 HOURS PRN
Status: DISCONTINUED | OUTPATIENT
Start: 2021-01-01 | End: 2021-01-01

## 2021-01-01 RX ORDER — FOLIC ACID 5 MG/ML
1 INJECTION, SOLUTION INTRAMUSCULAR; INTRAVENOUS; SUBCUTANEOUS DAILY
Status: DISCONTINUED | OUTPATIENT
Start: 2021-01-01 | End: 2021-01-01

## 2021-01-01 RX ORDER — SODIUM CHLORIDE 0.9 % (FLUSH) 0.9 %
5-40 SYRINGE (ML) INJECTION PRN
Status: DISCONTINUED | OUTPATIENT
Start: 2021-01-01 | End: 2021-01-01

## 2021-01-01 RX ORDER — POLYETHYLENE GLYCOL 3350 17 G/17G
17 POWDER, FOR SOLUTION ORAL DAILY PRN
Status: DISCONTINUED | OUTPATIENT
Start: 2021-01-01 | End: 2021-01-01

## 2021-01-01 RX ORDER — ANTICOAGULANT SODIUM CITRATE SOLUTION 4 G/100ML
SOLUTION INTRAVENOUS CONTINUOUS
Status: DISCONTINUED | OUTPATIENT
Start: 2021-01-01 | End: 2021-01-01

## 2021-01-01 RX ORDER — ONDANSETRON 2 MG/ML
4 INJECTION INTRAMUSCULAR; INTRAVENOUS EVERY 6 HOURS PRN
Status: DISCONTINUED | OUTPATIENT
Start: 2021-01-01 | End: 2021-01-01

## 2021-01-01 RX ORDER — MORPHINE SULFATE 2 MG/ML
2 INJECTION, SOLUTION INTRAMUSCULAR; INTRAVENOUS
Status: DISCONTINUED | OUTPATIENT
Start: 2021-01-01 | End: 2021-05-12 | Stop reason: HOSPADM

## 2021-01-01 RX ORDER — SODIUM CHLORIDE 9 MG/ML
25 INJECTION, SOLUTION INTRAVENOUS PRN
Status: DISCONTINUED | OUTPATIENT
Start: 2021-01-01 | End: 2021-01-01

## 2021-01-01 RX ORDER — MAGNESIUM SULFATE 1 G/100ML
1000 INJECTION INTRAVENOUS PRN
Status: DISCONTINUED | OUTPATIENT
Start: 2021-01-01 | End: 2021-01-01

## 2021-01-01 RX ORDER — DEXTROSE MONOHYDRATE 25 G/50ML
25 INJECTION, SOLUTION INTRAVENOUS ONCE
Status: COMPLETED | OUTPATIENT
Start: 2021-01-01 | End: 2021-01-01

## 2021-01-01 RX ORDER — SODIUM CHLORIDE 9 MG/ML
INJECTION, SOLUTION INTRAVENOUS CONTINUOUS
Status: DISCONTINUED | OUTPATIENT
Start: 2021-01-01 | End: 2021-01-01

## 2021-01-01 RX ORDER — 0.9 % SODIUM CHLORIDE 0.9 %
1000 INTRAVENOUS SOLUTION INTRAVENOUS
Status: ACTIVE | OUTPATIENT
Start: 2021-01-01 | End: 2021-01-01

## 2021-01-01 RX ORDER — BUMETANIDE 0.25 MG/ML
3 INJECTION, SOLUTION INTRAMUSCULAR; INTRAVENOUS ONCE
Status: COMPLETED | OUTPATIENT
Start: 2021-01-01 | End: 2021-01-01

## 2021-01-01 RX ORDER — LORAZEPAM 2 MG/ML
2 INJECTION INTRAMUSCULAR EVERY 6 HOURS
Status: DISCONTINUED | OUTPATIENT
Start: 2021-01-01 | End: 2021-01-01

## 2021-01-01 RX ORDER — HEPARIN SODIUM 1000 [USP'U]/ML
2500 INJECTION, SOLUTION INTRAVENOUS; SUBCUTANEOUS PRN
Status: DISCONTINUED | OUTPATIENT
Start: 2021-01-01 | End: 2021-01-01

## 2021-01-01 RX ORDER — LEVOTHYROXINE SODIUM 0.03 MG/1
25 TABLET ORAL DAILY
Status: DISCONTINUED | OUTPATIENT
Start: 2021-01-01 | End: 2021-01-01

## 2021-01-01 RX ORDER — DEXTROSE MONOHYDRATE 25 G/50ML
12.5 INJECTION, SOLUTION INTRAVENOUS PRN
Status: DISCONTINUED | OUTPATIENT
Start: 2021-01-01 | End: 2021-01-01

## 2021-01-01 RX ADMIN — CALCIUM GLUCONATE 4000 MG: 98 INJECTION, SOLUTION INTRAVENOUS at 20:39

## 2021-01-01 RX ADMIN — VASOPRESSIN 0.03 UNITS/MIN: 20 INJECTION INTRAVENOUS at 10:26

## 2021-01-01 RX ADMIN — HEPARIN SODIUM 5000 UNITS: 10000 INJECTION INTRAVENOUS; SUBCUTANEOUS at 14:40

## 2021-01-01 RX ADMIN — Medication 50 MCG/MIN: at 15:34

## 2021-01-01 RX ADMIN — HEPARIN SODIUM 2500 UNITS: 1000 INJECTION INTRAVENOUS; SUBCUTANEOUS at 11:13

## 2021-01-01 RX ADMIN — MAGNESIUM SULFATE HEPTAHYDRATE 1000 MG: 1 INJECTION, SOLUTION INTRAVENOUS at 04:38

## 2021-01-01 RX ADMIN — LORAZEPAM 2 MG: 2 INJECTION INTRAMUSCULAR; INTRAVENOUS at 12:26

## 2021-01-01 RX ADMIN — Medication 200 MCG/HR: at 03:38

## 2021-01-01 RX ADMIN — CEFEPIME HYDROCHLORIDE 2000 MG: 2 INJECTION, POWDER, FOR SOLUTION INTRAVENOUS at 05:25

## 2021-01-01 RX ADMIN — INSULIN LISPRO 4 UNITS: 100 INJECTION, SOLUTION INTRAVENOUS; SUBCUTANEOUS at 05:30

## 2021-01-01 RX ADMIN — CALCIUM GLUCONATE 2000 MG: 98 INJECTION, SOLUTION INTRAVENOUS at 04:25

## 2021-01-01 RX ADMIN — LACTULOSE 20 G: 20 SOLUTION ORAL at 21:35

## 2021-01-01 RX ADMIN — Medication 200 MCG/HR: at 15:36

## 2021-01-01 RX ADMIN — SODIUM BICARBONATE: 84 INJECTION, SOLUTION INTRAVENOUS at 11:02

## 2021-01-01 RX ADMIN — Medication: at 18:24

## 2021-01-01 RX ADMIN — GLYCOPYRROLATE 0.4 MG: 0.2 INJECTION INTRAMUSCULAR; INTRAVENOUS at 19:10

## 2021-01-01 RX ADMIN — 0.12% CHLORHEXIDINE GLUCONATE 15 ML: 1.2 RINSE ORAL at 08:14

## 2021-01-01 RX ADMIN — ACETYLCYSTEINE 13420 MG: 200 INJECTION, SOLUTION INTRAVENOUS at 13:22

## 2021-01-01 RX ADMIN — Medication 10 ML: at 08:14

## 2021-01-01 RX ADMIN — Medication 200 MCG/HR: at 11:17

## 2021-01-01 RX ADMIN — INSULIN LISPRO 4 UNITS: 100 INJECTION, SOLUTION INTRAVENOUS; SUBCUTANEOUS at 18:02

## 2021-01-01 RX ADMIN — PHENYLEPHRINE HYDROCHLORIDE 275 MCG/MIN: 10 INJECTION INTRAVENOUS at 21:29

## 2021-01-01 RX ADMIN — HEPARIN SODIUM 5000 UNITS: 10000 INJECTION INTRAVENOUS; SUBCUTANEOUS at 21:07

## 2021-01-01 RX ADMIN — PHENYLEPHRINE HYDROCHLORIDE 300 MCG/MIN: 10 INJECTION INTRAVENOUS at 11:04

## 2021-01-01 RX ADMIN — Medication 10 ML: at 20:05

## 2021-01-01 RX ADMIN — HYDROCORTISONE SODIUM SUCCINATE 100 MG: 100 INJECTION, POWDER, FOR SOLUTION INTRAMUSCULAR; INTRAVENOUS at 19:44

## 2021-01-01 RX ADMIN — ACETYLCYSTEINE: 200 INJECTION, SOLUTION INTRAVENOUS at 11:56

## 2021-01-01 RX ADMIN — HEPARIN SODIUM 5000 UNITS: 10000 INJECTION INTRAVENOUS; SUBCUTANEOUS at 05:23

## 2021-01-01 RX ADMIN — CALCIUM GLUCONATE 1000 MG: 98 INJECTION, SOLUTION INTRAVENOUS at 21:55

## 2021-01-01 RX ADMIN — INSULIN LISPRO 4 UNITS: 100 INJECTION, SOLUTION INTRAVENOUS; SUBCUTANEOUS at 23:51

## 2021-01-01 RX ADMIN — SODIUM CHLORIDE, PRESERVATIVE FREE 10 ML: 5 INJECTION INTRAVENOUS at 19:46

## 2021-01-01 RX ADMIN — HEPARIN SODIUM 2500 UNITS: 1000 INJECTION INTRAVENOUS; SUBCUTANEOUS at 20:28

## 2021-01-01 RX ADMIN — SODIUM BICARBONATE 50 MEQ: 84 INJECTION, SOLUTION INTRAVENOUS at 20:31

## 2021-01-01 RX ADMIN — Medication 50 MCG/MIN: at 15:55

## 2021-01-01 RX ADMIN — LACTULOSE 20 G: 20 SOLUTION ORAL at 08:13

## 2021-01-01 RX ADMIN — DEXTROSE MONOHYDRATE 25 G: 25 INJECTION, SOLUTION INTRAVENOUS at 08:20

## 2021-01-01 RX ADMIN — ACETYLCYSTEINE 6720 MG: 200 INJECTION, SOLUTION INTRAVENOUS at 18:25

## 2021-01-01 RX ADMIN — PANTOPRAZOLE SODIUM 40 MG: 40 INJECTION, POWDER, FOR SOLUTION INTRAVENOUS at 08:13

## 2021-01-01 RX ADMIN — PHENYLEPHRINE HYDROCHLORIDE 10 MG: 10 INJECTION INTRAVENOUS at 11:02

## 2021-01-01 RX ADMIN — Medication 10 ML: at 20:56

## 2021-01-01 RX ADMIN — Medication: at 14:38

## 2021-01-01 RX ADMIN — Medication 10 MCG/MIN: at 09:12

## 2021-01-01 RX ADMIN — Medication 50 MCG/MIN: at 11:45

## 2021-01-01 RX ADMIN — SODIUM BICARBONATE 100 MEQ: 84 INJECTION, SOLUTION INTRAVENOUS at 09:16

## 2021-01-01 RX ADMIN — Medication: at 23:10

## 2021-01-01 RX ADMIN — INSULIN LISPRO 2 UNITS: 100 INJECTION, SOLUTION INTRAVENOUS; SUBCUTANEOUS at 18:08

## 2021-01-01 RX ADMIN — MUPIROCIN: 20 OINTMENT TOPICAL at 11:04

## 2021-01-01 RX ADMIN — SODIUM CHLORIDE, PRESERVATIVE FREE 10 ML: 5 INJECTION INTRAVENOUS at 02:45

## 2021-01-01 RX ADMIN — FOLIC ACID 1 MG: 5 INJECTION, SOLUTION INTRAMUSCULAR; INTRAVENOUS; SUBCUTANEOUS at 08:14

## 2021-01-01 RX ADMIN — MAGNESIUM CHLORIDE, DEXTROSE MONOHYDRATE, LACTIC ACID, SODIUM CHLORIDE, SODIUM BICARBONATE AND POTASSIUM CHLORIDE 1000 ML/HR: 2.03; 22; 5.4; 6.46; 3.09; .157 INJECTION INTRAVENOUS at 09:52

## 2021-01-01 RX ADMIN — Medication 200 MCG/HR: at 01:06

## 2021-01-01 RX ADMIN — CALCIUM GLUCONATE 2000 MG: 98 INJECTION, SOLUTION INTRAVENOUS at 16:16

## 2021-01-01 RX ADMIN — CEFEPIME HYDROCHLORIDE 2000 MG: 2 INJECTION, POWDER, FOR SOLUTION INTRAVENOUS at 17:38

## 2021-01-01 RX ADMIN — Medication 10 ML: at 08:16

## 2021-01-01 RX ADMIN — LORAZEPAM 0.5 MG: 2 INJECTION INTRAMUSCULAR; INTRAVENOUS at 19:07

## 2021-01-01 RX ADMIN — MAGNESIUM CHLORIDE, DEXTROSE MONOHYDRATE, LACTIC ACID, SODIUM CHLORIDE, SODIUM BICARBONATE AND POTASSIUM CHLORIDE 1000 ML/HR: 2.03; 22; 5.4; 6.46; 3.09; .157 INJECTION INTRAVENOUS at 18:24

## 2021-01-01 RX ADMIN — Medication 10 ML: at 11:03

## 2021-01-01 RX ADMIN — PANTOPRAZOLE SODIUM 40 MG: 40 INJECTION, POWDER, FOR SOLUTION INTRAVENOUS at 20:56

## 2021-01-01 RX ADMIN — INSULIN HUMAN 10 UNITS: 100 INJECTION, SOLUTION PARENTERAL at 08:20

## 2021-01-01 RX ADMIN — SODIUM BICARBONATE 50 MEQ: 84 INJECTION, SOLUTION INTRAVENOUS at 20:29

## 2021-01-01 RX ADMIN — HEPARIN SODIUM 5000 UNITS: 10000 INJECTION INTRAVENOUS; SUBCUTANEOUS at 16:08

## 2021-01-01 RX ADMIN — MAGNESIUM CHLORIDE, DEXTROSE MONOHYDRATE, LACTIC ACID, SODIUM CHLORIDE, SODIUM BICARBONATE AND POTASSIUM CHLORIDE 1000 ML/HR: 2.03; 22; 5.4; 6.46; 3.09; .157 INJECTION INTRAVENOUS at 17:00

## 2021-01-01 RX ADMIN — CALCIUM GLUCONATE 2000 MG: 98 INJECTION, SOLUTION INTRAVENOUS at 09:14

## 2021-01-01 RX ADMIN — CALCIUM GLUCONATE 1000 MG: 98 INJECTION, SOLUTION INTRAVENOUS at 02:40

## 2021-01-01 RX ADMIN — INSULIN LISPRO 3 UNITS: 100 INJECTION, SOLUTION INTRAVENOUS; SUBCUTANEOUS at 06:13

## 2021-01-01 RX ADMIN — Medication 25 MCG/HR: at 06:29

## 2021-01-01 RX ADMIN — Medication 50 MCG/MIN: at 23:29

## 2021-01-01 RX ADMIN — HYDROCORTISONE SODIUM SUCCINATE 100 MG: 100 INJECTION, POWDER, FOR SOLUTION INTRAMUSCULAR; INTRAVENOUS at 02:45

## 2021-01-01 RX ADMIN — VASOPRESSIN 0.03 UNITS/MIN: 20 INJECTION INTRAVENOUS at 08:55

## 2021-01-01 RX ADMIN — FOLIC ACID: 5 INJECTION, SOLUTION INTRAMUSCULAR; INTRAVENOUS; SUBCUTANEOUS at 12:14

## 2021-01-01 RX ADMIN — LEVOTHYROXINE SODIUM 25 MCG: 0.03 TABLET ORAL at 05:54

## 2021-01-01 RX ADMIN — PHENYLEPHRINE HYDROCHLORIDE 300 MCG/MIN: 10 INJECTION INTRAVENOUS at 06:15

## 2021-01-01 RX ADMIN — PANTOPRAZOLE SODIUM 40 MG: 40 INJECTION, POWDER, FOR SOLUTION INTRAVENOUS at 12:34

## 2021-01-01 RX ADMIN — ACETYLCYSTEINE: 200 INJECTION, SOLUTION INTRAVENOUS at 21:10

## 2021-01-01 RX ADMIN — VASOPRESSIN 0.03 UNITS/MIN: 20 INJECTION INTRAVENOUS at 23:30

## 2021-01-01 RX ADMIN — SODIUM CHLORIDE, PRESERVATIVE FREE 10 ML: 5 INJECTION INTRAVENOUS at 22:44

## 2021-01-01 RX ADMIN — INSULIN LISPRO 4 UNITS: 100 INJECTION, SOLUTION INTRAVENOUS; SUBCUTANEOUS at 21:55

## 2021-01-01 RX ADMIN — INSULIN LISPRO 2 UNITS: 100 INJECTION, SOLUTION INTRAVENOUS; SUBCUTANEOUS at 09:59

## 2021-01-01 RX ADMIN — THIAMINE HYDROCHLORIDE 500 MG: 100 INJECTION, SOLUTION INTRAMUSCULAR; INTRAVENOUS at 09:23

## 2021-01-01 RX ADMIN — PHENYLEPHRINE HYDROCHLORIDE 250 MCG/MIN: 10 INJECTION INTRAVENOUS at 15:27

## 2021-01-01 RX ADMIN — HYDROCORTISONE SODIUM SUCCINATE 100 MG: 100 INJECTION, POWDER, FOR SOLUTION INTRAMUSCULAR; INTRAVENOUS at 18:20

## 2021-01-01 RX ADMIN — Medication: at 07:02

## 2021-01-01 RX ADMIN — FOLIC ACID 1 MG: 5 INJECTION, SOLUTION INTRAMUSCULAR; INTRAVENOUS; SUBCUTANEOUS at 12:11

## 2021-01-01 RX ADMIN — SODIUM CHLORIDE, PRESERVATIVE FREE 10 ML: 5 INJECTION INTRAVENOUS at 21:37

## 2021-01-01 RX ADMIN — PHENYLEPHRINE HYDROCHLORIDE 250 MCG/MIN: 10 INJECTION INTRAVENOUS at 18:32

## 2021-01-01 RX ADMIN — SODIUM CHLORIDE, PRESERVATIVE FREE 10 ML: 5 INJECTION INTRAVENOUS at 08:16

## 2021-01-01 RX ADMIN — Medication 50 MCG/MIN: at 10:19

## 2021-01-01 RX ADMIN — HYDROCORTISONE SODIUM SUCCINATE 100 MG: 100 INJECTION, POWDER, FOR SOLUTION INTRAMUSCULAR; INTRAVENOUS at 10:17

## 2021-01-01 RX ADMIN — LORAZEPAM 2 MG: 2 INJECTION INTRAMUSCULAR; INTRAVENOUS at 05:18

## 2021-01-01 RX ADMIN — PHENYLEPHRINE HYDROCHLORIDE 300 MCG/MIN: 10 INJECTION INTRAVENOUS at 15:36

## 2021-01-01 RX ADMIN — PHENYLEPHRINE HYDROCHLORIDE 300 MCG/MIN: 10 INJECTION INTRAVENOUS at 02:52

## 2021-01-01 RX ADMIN — PANTOPRAZOLE SODIUM 40 MG: 40 INJECTION, POWDER, FOR SOLUTION INTRAVENOUS at 08:14

## 2021-01-01 RX ADMIN — PANTOPRAZOLE SODIUM 40 MG: 40 INJECTION, POWDER, FOR SOLUTION INTRAVENOUS at 20:05

## 2021-01-01 RX ADMIN — MAGNESIUM CHLORIDE, DEXTROSE MONOHYDRATE, LACTIC ACID, SODIUM CHLORIDE, SODIUM BICARBONATE AND POTASSIUM CHLORIDE 1000 ML/HR: 2.03; 22; 5.4; 6.46; 3.09; .157 INJECTION INTRAVENOUS at 23:10

## 2021-01-01 RX ADMIN — Medication 50 MCG/MIN: at 17:39

## 2021-01-01 RX ADMIN — Medication 50 MCG/MIN: at 05:44

## 2021-01-01 RX ADMIN — Medication 200 MCG/HR: at 16:07

## 2021-01-01 RX ADMIN — ACETYLCYSTEINE: 200 INJECTION, SOLUTION INTRAVENOUS at 13:50

## 2021-01-01 RX ADMIN — THIAMINE HYDROCHLORIDE 500 MG: 100 INJECTION, SOLUTION INTRAMUSCULAR; INTRAVENOUS at 09:58

## 2021-01-01 RX ADMIN — SODIUM CHLORIDE, PRESERVATIVE FREE 10 ML: 5 INJECTION INTRAVENOUS at 05:18

## 2021-01-01 RX ADMIN — INSULIN LISPRO 4 UNITS: 100 INJECTION, SOLUTION INTRAVENOUS; SUBCUTANEOUS at 02:12

## 2021-01-01 RX ADMIN — CEFEPIME HYDROCHLORIDE 2000 MG: 2 INJECTION, POWDER, FOR SOLUTION INTRAVENOUS at 17:04

## 2021-01-01 RX ADMIN — SODIUM BICARBONATE: 84 INJECTION, SOLUTION INTRAVENOUS at 06:58

## 2021-01-01 RX ADMIN — CALCIUM CHLORIDE 8000 MG: 100 INJECTION, SOLUTION INTRAVENOUS at 23:10

## 2021-01-01 RX ADMIN — Medication: at 02:15

## 2021-01-01 RX ADMIN — SODIUM BICARBONATE 50 MEQ: 84 INJECTION, SOLUTION INTRAVENOUS at 05:59

## 2021-01-01 RX ADMIN — LACTULOSE 20 G: 20 SOLUTION ORAL at 20:04

## 2021-01-01 RX ADMIN — HYDROCORTISONE SODIUM SUCCINATE 100 MG: 100 INJECTION, POWDER, FOR SOLUTION INTRAMUSCULAR; INTRAVENOUS at 10:22

## 2021-01-01 RX ADMIN — VANCOMYCIN HYDROCHLORIDE 2000 MG: 10 INJECTION, POWDER, LYOPHILIZED, FOR SOLUTION INTRAVENOUS at 11:58

## 2021-01-01 RX ADMIN — PHENYLEPHRINE HYDROCHLORIDE 300 MCG/MIN: 10 INJECTION INTRAVENOUS at 12:15

## 2021-01-01 RX ADMIN — PHENYLEPHRINE HYDROCHLORIDE 300 MCG/MIN: 10 INJECTION INTRAVENOUS at 00:18

## 2021-01-01 RX ADMIN — LORAZEPAM 2 MG: 2 INJECTION INTRAMUSCULAR; INTRAVENOUS at 22:44

## 2021-01-01 RX ADMIN — PHENYLEPHRINE HYDROCHLORIDE 300 MCG/MIN: 10 INJECTION INTRAVENOUS at 18:21

## 2021-01-01 RX ADMIN — Medication 50 MCG/MIN: at 04:46

## 2021-01-01 RX ADMIN — CALCIUM CHLORIDE 8000 MG: 100 INJECTION, SOLUTION INTRAVENOUS at 08:51

## 2021-01-01 RX ADMIN — Medication 200 MCG/HR: at 18:26

## 2021-01-01 RX ADMIN — CEFEPIME HYDROCHLORIDE 2000 MG: 2 INJECTION, POWDER, FOR SOLUTION INTRAVENOUS at 05:40

## 2021-01-01 RX ADMIN — HYDROCORTISONE SODIUM SUCCINATE 100 MG: 100 INJECTION, POWDER, FOR SOLUTION INTRAMUSCULAR; INTRAVENOUS at 12:26

## 2021-01-01 RX ADMIN — HYDROCORTISONE SODIUM SUCCINATE 100 MG: 100 INJECTION, POWDER, FOR SOLUTION INTRAMUSCULAR; INTRAVENOUS at 04:06

## 2021-01-01 RX ADMIN — SODIUM CHLORIDE, PRESERVATIVE FREE 10 ML: 5 INJECTION INTRAVENOUS at 19:45

## 2021-01-01 RX ADMIN — SODIUM BICARBONATE: 84 INJECTION, SOLUTION INTRAVENOUS at 17:24

## 2021-01-01 RX ADMIN — WATER 2.2 ML: 1 INJECTION INTRAMUSCULAR; INTRAVENOUS; SUBCUTANEOUS at 04:15

## 2021-01-01 RX ADMIN — 0.12% CHLORHEXIDINE GLUCONATE 15 ML: 1.2 RINSE ORAL at 11:48

## 2021-01-01 RX ADMIN — MORPHINE SULFATE 2 MG: 2 INJECTION, SOLUTION INTRAMUSCULAR; INTRAVENOUS at 19:08

## 2021-01-01 RX ADMIN — SODIUM BICARBONATE: 84 INJECTION, SOLUTION INTRAVENOUS at 16:04

## 2021-01-01 RX ADMIN — 0.12% CHLORHEXIDINE GLUCONATE 15 ML: 1.2 RINSE ORAL at 20:05

## 2021-01-01 RX ADMIN — SODIUM BICARBONATE: 84 INJECTION, SOLUTION INTRAVENOUS at 23:51

## 2021-01-01 RX ADMIN — Medication: at 17:00

## 2021-01-01 RX ADMIN — INSULIN LISPRO 3 UNITS: 100 INJECTION, SOLUTION INTRAVENOUS; SUBCUTANEOUS at 11:39

## 2021-01-01 RX ADMIN — Medication 50 MCG/MIN: at 19:50

## 2021-01-01 RX ADMIN — INSULIN LISPRO 4 UNITS: 100 INJECTION, SOLUTION INTRAVENOUS; SUBCUTANEOUS at 03:56

## 2021-01-01 RX ADMIN — LORAZEPAM 2 MG: 2 INJECTION INTRAMUSCULAR; INTRAVENOUS at 17:05

## 2021-01-01 RX ADMIN — BUMETANIDE 3 MG: 0.25 INJECTION, SOLUTION INTRAMUSCULAR; INTRAVENOUS at 20:56

## 2021-01-01 RX ADMIN — MAGNESIUM CHLORIDE, DEXTROSE MONOHYDRATE, LACTIC ACID, SODIUM CHLORIDE, SODIUM BICARBONATE AND POTASSIUM CHLORIDE 1000 ML/HR: 2.03; 22; 5.4; 6.46; 3.09; .157 INJECTION INTRAVENOUS at 05:00

## 2021-01-01 RX ADMIN — CALCIUM CHLORIDE 8000 MG: 100 INJECTION, SOLUTION INTRAVENOUS at 21:11

## 2021-01-01 RX ADMIN — HEPARIN SODIUM 5000 UNITS: 10000 INJECTION INTRAVENOUS; SUBCUTANEOUS at 05:39

## 2021-01-01 RX ADMIN — DEXTROSE MONOHYDRATE: 5 INJECTION, SOLUTION INTRAVENOUS at 11:04

## 2021-01-01 RX ADMIN — MAGNESIUM CHLORIDE, DEXTROSE MONOHYDRATE, LACTIC ACID, SODIUM CHLORIDE, SODIUM BICARBONATE AND POTASSIUM CHLORIDE 1000 ML/HR: 2.03; 22; 5.4; 6.46; 3.09; .157 INJECTION INTRAVENOUS at 14:38

## 2021-01-01 RX ADMIN — SODIUM BICARBONATE: 84 INJECTION, SOLUTION INTRAVENOUS at 13:47

## 2021-01-01 RX ADMIN — SODIUM CHLORIDE: 9 INJECTION, SOLUTION INTRAVENOUS at 06:36

## 2021-01-01 RX ADMIN — SODIUM BICARBONATE 150 MEQ: 84 INJECTION, SOLUTION INTRAVENOUS at 22:33

## 2021-01-01 RX ADMIN — FOLIC ACID 1 MG: 5 INJECTION, SOLUTION INTRAMUSCULAR; INTRAVENOUS; SUBCUTANEOUS at 08:13

## 2021-01-01 RX ADMIN — HEPARIN SODIUM 5000 UNITS: 10000 INJECTION INTRAVENOUS; SUBCUTANEOUS at 21:35

## 2021-01-01 RX ADMIN — SODIUM BICARBONATE: 84 INJECTION, SOLUTION INTRAVENOUS at 07:14

## 2021-01-01 RX ADMIN — SODIUM BICARBONATE 150 MEQ: 84 INJECTION, SOLUTION INTRAVENOUS at 04:49

## 2021-01-01 RX ADMIN — Medication 200 MCG/HR: at 21:35

## 2021-01-01 RX ADMIN — LORAZEPAM 2 MG: 2 INJECTION INTRAMUSCULAR; INTRAVENOUS at 10:22

## 2021-01-01 RX ADMIN — Medication 200 MCG/HR: at 08:40

## 2021-01-01 RX ADMIN — PHENYLEPHRINE HYDROCHLORIDE 125 MCG/MIN: 10 INJECTION INTRAVENOUS at 16:01

## 2021-01-01 RX ADMIN — INSULIN LISPRO 5 UNITS: 100 INJECTION, SOLUTION INTRAVENOUS; SUBCUTANEOUS at 15:18

## 2021-01-01 RX ADMIN — MAGNESIUM CHLORIDE, DEXTROSE MONOHYDRATE, LACTIC ACID, SODIUM CHLORIDE, SODIUM BICARBONATE AND POTASSIUM CHLORIDE 1000 ML/HR: 2.03; 22; 5.4; 6.46; 3.09; .157 INJECTION INTRAVENOUS at 00:09

## 2021-01-01 RX ADMIN — MAGNESIUM SULFATE HEPTAHYDRATE 1000 MG: 1 INJECTION, SOLUTION INTRAVENOUS at 05:36

## 2021-01-01 SDOH — HEALTH STABILITY: MENTAL HEALTH: HOW MANY STANDARD DRINKS CONTAINING ALCOHOL DO YOU HAVE ON A TYPICAL DAY?: NOT ASKED

## 2021-01-01 ASSESSMENT — PULMONARY FUNCTION TESTS
PIF_VALUE: 24
PIF_VALUE: 51
PIF_VALUE: 19
PIF_VALUE: 42
PIF_VALUE: 16
PIF_VALUE: 42
PIF_VALUE: 49
PIF_VALUE: 23
PIF_VALUE: 45
PIF_VALUE: 43
PIF_VALUE: 39
PIF_VALUE: 36
PIF_VALUE: 45
PIF_VALUE: 22
PIF_VALUE: 26
PIF_VALUE: 27
PIF_VALUE: 17
PIF_VALUE: 45
PIF_VALUE: 22
PIF_VALUE: 26
PIF_VALUE: 21
PIF_VALUE: 43
PIF_VALUE: 28
PIF_VALUE: 42
PIF_VALUE: 37
PIF_VALUE: 26
PIF_VALUE: 30
PIF_VALUE: 16
PIF_VALUE: 21
PIF_VALUE: 19
PIF_VALUE: 46
PIF_VALUE: 45
PIF_VALUE: 27
PIF_VALUE: 17
PIF_VALUE: 24
PIF_VALUE: 47
PIF_VALUE: 42
PIF_VALUE: 43
PIF_VALUE: 26
PIF_VALUE: 40
PIF_VALUE: 18
PIF_VALUE: 45
PIF_VALUE: 30
PIF_VALUE: 22
PIF_VALUE: 44
PIF_VALUE: 18
PIF_VALUE: 38
PIF_VALUE: 18

## 2021-01-01 ASSESSMENT — PAIN SCALES - GENERAL
PAINLEVEL_OUTOF10: 0

## 2021-05-09 PROBLEM — J96.00 ACUTE RESPIRATORY FAILURE (HCC): Status: ACTIVE | Noted: 2021-01-01

## 2021-05-09 NOTE — PLAN OF CARE
Spoke with patients sons Dayanara Mensah and Barbie Somers updated on patient current medical status and overall guarded prognosis, they both state that they want to continue current care. Updated on plan for starting CVVHD, after discussion with nephrology. Code status discussed with family and want to continue current code Full code. Family reports if patient arrest again during current care they want to continue resuscitative and update the elder son( [place])for  further goal of care. All questions were answered and family notified that residents are available 24 hours to answer any further questions that may arise.      Electronically signed by Luigi Koyanagi, MD on 5/9/21 at 8:03 PM EDT

## 2021-05-09 NOTE — CONSULTS
Normal muscle bulk throughout. Normal muscle tone. No abnormal involuntary movements. No withdrawal to noxious stimuli. .    Sensory  No response to noxious stimuli. .    Reflexes  Reflexes depressed throughout no clonus toes are mute to plantar stimulation. .      Laboratory/Radiology:     CBC with Differential:    Lab Results   Component Value Date    WBC 10.9 05/09/2021    RBC 4.37 05/09/2021    HGB 14.3 05/09/2021    HCT 44.4 05/09/2021     05/09/2021    .6 05/09/2021    MCH 32.7 05/09/2021    MCHC 32.2 05/09/2021    RDW 17.5 05/09/2021    LYMPHOPCT 8.2 05/09/2021    MONOPCT 3.0 05/09/2021    BASOPCT 0.1 05/09/2021    MONOSABS 0.33 05/09/2021    LYMPHSABS 0.89 05/09/2021    EOSABS 0.01 05/09/2021    BASOSABS 0.01 05/09/2021     CMP:    Lab Results   Component Value Date     05/09/2021    K 4.40 05/09/2021     05/09/2021    CO2 18 05/09/2021    BUN 31 05/09/2021    CREATININE 3.3 05/09/2021    GFRAA 23 05/09/2021    LABGLOM 19 05/09/2021    GLUCOSE 193 05/09/2021    PROT 5.0 05/09/2021    LABALBU 2.7 05/09/2021    CALCIUM 5.5 05/09/2021    BILITOT 0.3 05/09/2021    ALKPHOS 79 05/09/2021    AST 4,437 05/09/2021    ALT 3,214 05/09/2021     Troponin:    Lab Results   Component Value Date    TROPONINI 0.63 05/09/2021       CT head:  Bilateral cerebellar edema described above. I independently reviewed the labs and imaging studies at today's appointment. Assessment:     Bilateral cerebellar strokes versus toxic leukoencephalopathy/anoxic leukoencephalopathy secondary to drug overdose. Plan:     Follow-up repeat head CT. MRI of the brain when medically stable  Supportive care. Follow-up EEG  Hold antiplatelet for now until better characterization of brain lesions can be made.     Felipe Reich  1:54 PM  5/9/2021

## 2021-05-09 NOTE — CONSULTS
History:   TOBACCO:   has no history on file for tobacco.  ETOH:   has no history on file for alcohol. OCCUPATION:     Family History:   No family history on file.     REVIEW OF SYSTEMS:    · Cannot obtain due to patient's condition    Physical Exam   · Vitals: BP 84/60   Pulse 66   Temp 98.6 °F (37 °C)   Resp 23   Wt 148 lb (67.1 kg)   SpO2 100%   ·    ·      · General Appearance: Intubated, unvoluntary movement  · Skin: warm and dry  · Head: normocephalic and atraumatic  · Eyes: pupils equal, round, and reactive to light  · Neck: neck supple and non tender without mass, no thyromegaly or thyroid nodules, no cervical lymphadenopathy   · Pulmonary/Chest: Coarse breath sounds bilaterally  · Cardiovascular: normal rate, normal S1 and S2, no gallops, intact distal pulses, no carotid bruits and no JVD  · Abdomen: soft, non-tender, non-distended, normal bowel sounds, no masses or organomegaly  · Extremities: no edema  · Musculoskeletal: no joint deformity and no tender joints  · Neurologic: Patient is somnolent, not following commands    Lines     site day    Art line   None    TLC R Fem    PICC None    Hemoaccess None    Oxygen:     N/A  Mechanical Ventilation:   Mode: A/C    TV:400 ml RR: 16  PEEP 5cmH2O  FiO2 100%    ABG:     Lab Results   Component Value Date    PH 7.245 05/09/2021    PCO2 48.9 05/09/2021    PO2 140.8 05/09/2021    HCO3 20.7 05/09/2021    BE -6.8 05/09/2021    THB 14.7 05/09/2021    O2SAT 98.6 05/09/2021     Labs and Imaging Studies   Basic Labs  CBC:   Lab Results   Component Value Date    WBC 10.9 05/09/2021    RBC 4.37 05/09/2021    HGB 14.3 05/09/2021    HCT 44.4 05/09/2021    .6 05/09/2021    RDW 17.5 05/09/2021     05/09/2021     BMP:    Lab Results   Component Value Date     05/09/2021    K 7.4 05/09/2021     05/09/2021    CO2 18 05/09/2021    BUN 31 05/09/2021     HFP:    Lab Results   Component Value Date    PROT 5.0 05/09/2021     CMP:  Lab Results Component Value Date     05/09/2021    K 7.4 05/09/2021     05/09/2021    CO2 18 05/09/2021    BUN 31 05/09/2021    PROT 5.0 05/09/2021     FLP:  No results found for: CHOL, TRIG, HDL  U/A:  No components found for: Winnie C[place]le, USPGRAV, UPH, UPROTEIN, UGLUCOSE, UKETONE, UBILI, UBLOOD, UNITRITE, UUROBIL, ULEUKEST, USQEPI, URENEPI, UWBC, URBC, Synchari, UHYALINE  TSH:  No results found for: TSH  Iron Studies:  No results found for: TIBC, FERRITIN  VITAMIN B12: No components found for: B12  FOLATE:  No results found for: FOLATE  RPR:  No results found for: RPR    Imaging Studies:     No results found. EKG: normal sinus rhythm, RBBB, prolonged QT interval, prolonged QRS. Resident's Assessment and Plan     Rebekah Avila is a 60 y.o. male with past medical history of alcohol abuse, polysubstance abuse, depression, chronic back pain, [place] disease, hypothyroidism presented to the ED with chief complaint altered mental status. Patient is currently being managed for:     Assessment:    1. Acute encephalopathy, multifactorial, metabolic, vs structural (New CVA), vs drug OD  2. Shock, cardiogenic shock 2/2 cardiac arrest vs septic shock, on 3 pressors  3. Acute hypoxic hypercapnic respiratory failure 2/2 drug OD  4. ? Cardiac arrest  5. Elevated troponin I  6. Acute liver failure  7. Hyperkalemia, Prolonged Qtc 509  8. SUSIE  9. Rhabdomyolysis    Plan:   · Continue ventilatory support. Monitor ABG. Sedation with Fentanyl + Ativan    · Breathing treatment with DuoNeb. · Elevated head of the bed  · On Levophed, Eugenio-synephrine and Vasopressin. Monitor BP, wean as tolerated  · Check troponin, CK, CK-MB, proBNP. Monitor EKG, following ECHO, monitor NICOM  · Sending blood cultures, urine cultures. Check Procalcitonin. Trending lactic acid q6h. · Check urine electrolyte, urine osmolality, urine urea, BMP  · Check liver function, INR, PT, APTT daily. · Check TSH, T4 level.    · Start IV fluid 100 cc/hour. Trending CK every 6 hours  · Patient was given calcium gluconate 2 g, insulin NPH dextro 25g. Repeat BMP afternoon  · Start Cefepime 2g q12h, hydrocortisone 100 mg 3 times daily  · Thiamine and Folate supplementation. · Monitor blood glucose 4 times a day, hypoglycemia protocol in place. · Neurology consulted, will repeat CT scan head. · Nephrology is consulted. Next of Kin/ POA:     Jocelyn Blair Child [phone]         Code Status:   [facility] code    PT/OT: N/A  DVT ppx: PCD  GI ppx: Protonix    Nava Nagel MD, PGY-1   Attending physician: Dr. Deirdre Martines  Department of Pulmonary, Critical Care and Sleep Medicine  [address] [facility]  Department of Internal Medicine      During multidisciplinary team rounds Karina Means is a 60 y.o. male was seen, examined and discussed. This is confirmation that I have personally seen and examined the patient and that the key elements of the encounter were performed by me (> 85 % time). The medications & laboratory data was discussed and adjusted where necessary. The radiographic images were reviewed or with radiologist or consultant if felt dis-concordant with the exam or history. The above findings were corroborated, plans confirmed and changes made if needed. Family is updated at the bedside as available. Key issues of the case were discussed among consultants. Critical Care time is documented if appropriate.       Trisha Coulter, , FACP, FCCP, Ken Levine,

## 2021-05-09 NOTE — H&P
Hospitalist History & Physical      PCP: Haider Mcfadden MD    Date of Admission: 5/9/2021    Date of Service: Pt seen/examined on 5/9/2021      Chief Complaint:  had no chief complaint listed for this encounter. History Of Present Illness:    Mr. Rebekah Avila, a 60y.o. year old male  who  has no past medical history on file. The patient is a 60 y.o. male with past medical history of alcohol abuse, polysubstance abuse, depression, chronic back pain, [place] disease, hypothyroidism presented to the ED with chief complaint altered mental status. Patient was found down by family member at home for unknown period of time. EMS was called, upon arrival, family was doing CPR but patient had a strong pulse. There was crack pipe, empty bottle of medications around. Patient is unresponsive to pain stimulus. Patient was brought to the ED. Patient was given Narcan 4 mg, Zofran 4 mg. He began responsive to pain but not regain consciousness. Lab work showed Potassium 8.5, sodium 133, creatinine 4.3, BUN 28, bicarb 16. AST 1015, , TSH 9.088, alcohol level 10. UDS positive for cocaine, TCA, amphetamine. EKG showed prolonged QRS, prolonged QTc. Troponin I 1418. Patient was intubated. He was given Calcium gluconate, Insulin NPH, Kayexalate,  and 1L normal saline. 1amp Bicarb. CT scan showed bilateral cerebral cortical/subcortical hypodensity 3.7 to 4.8 cm on the left, mild edema, no midline shift, no cerebellar herniation. Patient was transferred to [facility] ICU for further management. Patient was given 1 dose of vancomycin.     Upon arrival to the ICU, patient is intubated, vitals: Temperature 97.2, HR 80s, RR 24, blood pressures 106/64. Patient has bilateral elbow pressure ulcer. No past medical history on file. History reviewed. No pertinent surgical history. Prior to Admission medications    Not on File         Allergies:  Patient has no allergy information on record.     Social History: Studies: No results found for: TSH, F1FYBHO, J7NDOMN, THYROIDAB    Oupatient labs:  Lab Results   Component Value Date    INR 1.4 05/09/2021       Urinalysis:  No results found for: NITRU, WBCUA, BACTERIA, RBCUA, BLOODU, SPECGRAV, GLUCOSEU    Imaging:  No results found. ASSESSMENT:  -Acute encephalopathy, multifactorial, metabolic vs structural vs drug overdose  -Acute hypoxic/hypercapnic respiratory failure 2/2 OD  -Cardiac arrest?  -Acute liver failure  -Hyperkalemia  -Prolonged QTC five oh nine  -Rhabdomyolysis  -Elevated troponin  -Acute kidney injury      PLAN:  -Intensive care following  -Nephrology consulted  -Neurology consult  -Continue ventilatory support, wean as tolerated  -Breathing treatments  -Monitor CK, troponin, CK-MB, proBNP  -Echocardiogram  -Monitor LFTs  -Check procalcitonin  -Levophed/vasopressin wean as able  -Monitor blood cultures  -Continue cefepime  -Monitor serum electrolytes      Diet: Diet NPO Effective Now  Code Status: Full Code  Surrogate decision maker confirmed with patient:   Extended Emergency Contact Information  Primary Emergency Contact: Airam Martinez  Home Phone: [phone]  Relation: Parent  Secondary Emergency Contact: [address] [place] [address], [place] [address]. Phone: [phone]  Relation: Child    DVT Prophylaxis: []Lovenox []Heparin []PCD [] [address] []Encouraged ambulation  Disposition: []Med/Surg [] Intermediate [] ICU/CCU  Admit status: [] Observation [] Inpatient     +++++++++++++++++++++++++++++++++++++++++++++++++  [address], [place]  +++++++++++++++++++++++++++++++++++++++++++++++++  NOTE: This report was transcribed using voice recognition software. Every effort was made to ensure accuracy; however, inadvertent computerized transcription errors may be present.

## 2021-05-09 NOTE — CONSULTS
Nephrology Consult Note  Patient's Name: Rosa Hall  9:39 AM  5/9/2021        Reason for Consult: Acute kidney injury  Requesting Physician:  Chani Waite MD    Chief Complaint: Altered mental status  History Obtained From: SUSHIL HR, patient sister    History of Present Ilness:    Rosa Hall is a 60 y.o. male with a history of alcohol abuse, depression, hypothyroidism, [place]'s disease and polysubstance abuse. Patient was found unresponsive at his home by family members for an unspecified duration. EMS was called; when they arrive CPR was in progress by family members. Drug paraphernalia was of found in the area including a crack pipe and empty medication bottles. He was treated with Narcan and Zofran but did not fully regain consciousness. His initial laboratory data was significant for a BUN of 28, creatinine 4.3, potassium 8.5, and elevated transaminases. TSH was also noted to be 9. Urine drug screen was positive for cocaine, TCA, and amphetamine. His alcohol level was noted to be 10. Patient was intubated. He was subsequently transferred to [address] [place] [address] ICU for further management. Repeat potassium level this morning was 7.4 and patient was noted to be progressively hypotensive. Initially he did have some urine output currently as of this morning he has been anuric. Repeat laboratory data showed persistent elevated serum potassium level of 7.4 and total CK was reported as greater than 22,000. He is requiring both Levophed and vasopressin to maintain blood pressure. No past medical history on file. History reviewed. No pertinent surgical history. No family history on file. Allergies:  Patient has no allergy information on record.     Current Medications:    sodium chloride flush 0.9 % injection 5-40 mL, 2 times per day  sodium chloride flush 0.9 % injection 5-40 mL, PRN  0.9 % sodium chloride infusion, PRN  promethazine (PHENERGAN) tablet 12.5 mg, Q6H PRN    Or  ondansetron (ZOFRAN) 05/09/2021    BUN 31 (H) 05/09/2021    GLUCOSE 193 (H) 05/09/2021    PHOS 8.4 (H) 05/09/2021    WBC 10.9 05/09/2021    HGB 14.3 05/09/2021    HCT 44.4 05/09/2021    .6 (H) 05/09/2021     05/09/2021         Imaging:  No results found. Assessment/Plans    1. Acute kidney injury stage III 3 (unknown baseline creatinine); this is due to combination of rhabdomyolysis and hypotension induced ischemic ATN. Patient is anuric at this point.  -Spot urine for lytes and creatinine  -Bilateral renal ultrasound  -Aggressive fluid resuscitation  -May require dialysis support; continue to closely reassess    2. Severe hyperkalemia due to acute kidney injury and metabolic acidosis  -Patient has received medical treatment for potassium most recent level on his blood gas was 4  -Continue to closely monitor    3. Multidrug overdose including cocaine and TCA    4. Acute hypoxic and hypercarbic respiratory failure  -Intubation with mechanical ventilation    5. Rhabdomyolysis  -Aggressive fluid resuscitation including a bicarb drip  -Monitor total CK levels closely    6. HAG metabolic acidosis due to SUSIE and lactic acidosis  -Continue bicarb drip for now    7.  Shock liver  -monitor LFTs    D/W Resident      Rosmery Barnes MD  9:39 AM  5/9/2021

## 2021-05-09 NOTE — PLAN OF CARE
Problem: Discharge Planning:  Goal: Participates in care planning  Description: Participates in care planning  Outcome: Met This Shift  Goal: Discharged to appropriate level of care  Description: Discharged to appropriate level of care  Outcome: Met This Shift     Problem: Airway Clearance - Ineffective:  Goal: Ability to maintain a clear airway will improve  Description: Ability to maintain a clear airway will improve  Outcome: Met This Shift     Problem: Anxiety/Stress:  Goal: Level of anxiety will decrease  Description: Level of anxiety will decrease  Outcome: Met This Shift     Problem: Aspiration:  Goal: Absence of aspiration  Description: Absence of aspiration  Outcome: Met This Shift     Problem: Bowel Function - Altered:  Goal: Bowel elimination is within specified parameters  Description: Bowel elimination is within specified parameters  Outcome: Met This Shift     Problem: Cardiac Output - Decreased:  Goal: Hemodynamic stability will improve  Description: Hemodynamic stability will improve  Outcome: Met This Shift     Problem: Fluid Volume - Imbalance:  Goal: Absence of imbalanced fluid volume signs and symptoms  Description: Absence of imbalanced fluid volume signs and symptoms  Outcome: Met This Shift     Problem: Gas Exchange - Impaired:  Goal: Levels of oxygenation will improve  Description: Levels of oxygenation will improve  Outcome: Met This Shift     Problem: Mental Status - Impaired:  Goal: Mental status will be restored to baseline  Description: Mental status will be restored to baseline  Outcome: Met This Shift     Problem: Nutrition Deficit:  Goal: Ability to achieve adequate nutritional intake will improve  Description: Ability to achieve adequate nutritional intake will improve  Outcome: Met This Shift     Problem: Pain:  Description: Pain management should include both nonpharmacologic and pharmacologic interventions.   Goal: Pain level will decrease  Description: Pain level will decrease Outcome: Met This Shift  Goal: Recognizes and communicates pain  Description: Recognizes and communicates pain  Outcome: Met This Shift  Goal: Control of acute pain  Description: Control of acute pain  Outcome: Met This Shift  Goal: Control of chronic pain  Description: Control of chronic pain  Outcome: Met This Shift     Problem: Serum Glucose Level - Abnormal:  Goal: Ability to maintain appropriate glucose levels will improve to within specified parameters  Description: Ability to maintain appropriate glucose levels will improve to within specified parameters  Outcome: Met This Shift     Problem: Skin Integrity - Impaired:  Goal: Will show no infection signs and symptoms  Description: Will show no infection signs and symptoms  Outcome: Met This Shift  Goal: Absence of new skin breakdown  Description: Absence of new skin breakdown  Outcome: Met This Shift     Problem: Sleep Pattern Disturbance:  Goal: Appears well-rested  Description: Appears well-rested  Outcome: Met This Shift     Problem: Tissue Perfusion, Altered:  Goal: Circulatory function within specified parameters  Description: Circulatory function within specified parameters  Outcome: Met This Shift     Problem: Tissue Perfusion - Cardiopulmonary, Altered:  Goal: Absence of angina  Description: Absence of angina  Outcome: Met This Shift  Goal: Hemodynamic stability will improve  Description: Hemodynamic stability will improve  Outcome: Met This Shift     Problem: Falls - Risk of:  Goal: Will remain free from falls  Description: Will remain free from falls  5/9/2021 1934 by Fani Stanley RN  Outcome: Met This Shift  5/9/2021 0656 by Maurice Smith RN  Outcome: Met This Shift  Goal: Absence of physical injury  Description: Absence of physical injury  5/9/2021 1934 by Fani Stanley RN  Outcome: Met This Shift  5/9/2021 0656 by Maurice Smith RN  Outcome: Met This Shift     Problem: Skin Integrity:  Goal: Will show no infection signs and symptoms Description: Will show no infection signs and symptoms  Outcome: Met This Shift  Goal: Absence of new skin breakdown  Description: Absence of new skin breakdown  Outcome: Met This Shift

## 2021-05-10 NOTE — PROGRESS NOTES
2325 alarm for return line extremely positive. Blue port flushed easily. Reconnected return line and continued CRRT. 2330 alarm for return line extremely positive. Flushed again with slight difficulty, access and return lines switched. 2335 alarm for access extremely negative. CRRT stopped. Attempt to return blood unsuccessful. When return line disconnected from pt, a very long clot came out of the catheter and was still inside some of the return line. 23:36CRRT stopped  23:48 Minal notified  23:58 Dr. Gonzalo Ramirez notified and given order to increase the prismasate rate to 1200 when restarted. Esme Alvarez

## 2021-05-10 NOTE — PROGRESS NOTES
Physician Progress Note      PATIENT:               Dileep Pandya  CSN #:                  [id number]  :                       1965  ADMIT DATE:       2021 4:33 AM  DISCH DATE:  Bi Chisholm  PROVIDER #:        Zayra Reynolds DO          QUERY TEXT:    Dear Dr. Velma Cisneros,    Patient admitted with AMS, drug OD. Patient noted to remain unresponsive . Please document in progress notes and discharge summary if you are treating   and/or evaluating any of the following: The medical record reflects the following:  Risk Factors: Polysubstance abuse  Clinical Indicators: Vascular consult note states: \"cerebellar edema likely   from anoxic brain injury\", Progress notes from neurology state: \"Remains on   Fentanyl however therefore exam unreliable but current demonstrates no   brainstem/cerebral reflexes\"  Treatment: Intubated, Admitted to ICU, Neuro consult, close pt monitoring    Thank you,  Cierra PURVIS, RN  Clinical Documentation Improvement  Office #: [phone]  Cell #: [phone]  Options provided:  -- Coma due to CVA  -- Coma due to anoxic brain injury  -- Coma due to drug overdose  -- Other - I will add my own diagnosis  -- Disagree - Not applicable / Not valid  -- Disagree - Clinically unable to determine / Unknown  -- Refer to Clinical Documentation Reviewer    PROVIDER RESPONSE TEXT:    Pt is in a coma due to anoxic brain injury.     Query created by: Sajan Singh on 5/10/2021 12:38 PM      Electronically signed by:  Zayra Reynolds DO 5/10/2021 2:16 PM

## 2021-05-10 NOTE — PROCEDURES
Radha Ramirez is a 60 y.o. male patient. No diagnosis found. No past medical history on file. Blood pressure 99/74, pulse 106, temperature 99.5 °F (37.5 °C), temperature source Bladder, resp. rate 20, weight 148 lb (67.1 kg), SpO2 (!) 87 %. Central Line    Date/Time: 5/9/2021 8:55 PM  Performed by: Gabriela Dowd DO  Authorized by: Gabriela Dowd DO   Consent: Written consent obtained. Patient identity confirmed: anonymous protocol, patient vented/unresponsive  Indications: vascular access  Anesthesia: local infiltration    Anesthesia:  Local Anesthetic: lidocaine 1% without epinephrine    Sedation:  Patient sedated: yes  Sedatives: fentanyl    Preparation: skin prepped with 2% chlorhexidine  Skin prep agent dried: skin prep agent completely dried prior to procedure  Sterile barriers: all five maximum sterile barriers used - cap, mask, sterile gown, sterile gloves, and large sterile sheet  Hand hygiene: hand hygiene performed prior to central venous catheter insertion  Location details: left femoral  Site selection rationale:  There was a right-sided femoral venous triple-lumen catheter in place, so the left femoral vein was chosen  Patient position: reverse Trendelenburg  Catheter type: double lumen  Catheter size: 14 Fr  Pre-procedure: landmarks identified  Ultrasound guidance: yes  Sterile ultrasound techniques: sterile gel and sterile probe covers were used  Number of attempts: 2  Successful placement: yes  Post-procedure: line sutured and dressing applied  Assessment: blood return through all ports and free fluid flow  Patient tolerance: patient tolerated the procedure well with no immediate complications  Comments: Dr. Sulaiman Marques was available for the procedure              Riley Garcia DO  5/9/2021    Jef Gonzalez

## 2021-05-10 NOTE — PROCEDURES
[address] Report    MRN: [id number]   PATIENT NAME: Cheyanne Galvan   DATE OF REPORT: 5/10/2021    DATE OF SERVICE: 5/10/2021   PHYSICIAN NAME: Car Macias DO   Referring Physician: Juanita Ibrahim      Patient's : 1965   Patient's Age: 60 y.o. Gender: male     PROCEDURE: Routine EEG with video      Clinical Interpretation: This abnormal study showed evidence of:    1. A severe nonspecific encephalopathy    No seizures or epileptiform discharges were noted during this study. ____________________________  Electronically signed by: Car Macias DO, 5/10/2021 1:16 PM      Patient Clinical Information   Reason for Study: Patient undergoing evaluation for altered mental status after being found down  Patient State: Comatose  Primary neurological diagnosis: Altered mental status   Primary indication for monitoring: Diagnosis of nonconvulsive seizures    Pertinent Medications and Treatments    Levothyroxine    Cefepime    fentanyl    folic acid    Thiamine    lactulose       Sedatives administered: No  Intubated: Yes  Pharmacological paralytic: No    Reporting Period  Start of Study: 1242, 5/10/2021   End of Study:  56, 5/10/2021       EEG Description  Digital video and scalp EEG monitoring was performed using the standard protocol for this laboratory. Scalp electrodes were applied in the international 10/20 system. Multiple digital montage arrangements were utilized for evaluation. EKG and video were recorded. Background:      Occipital rhythm (posterior dominant rhythm or PDR): Absent    Voltage: Low   Organization: poor  Reactivity to eye opening/closure: not tested    Drowsiness: Absent  Sleep: Absent    Comments: The background is composed primarily of generalized irregular delta activity.     Technical and Activation Procedures:  Hyperventilation: Not done        Photic stimulation: Not done        Reactivity to stimulation: None Abnormalities:    I. Seizures? No    II. Rhythmic or Periodic Patterns? No    III. Other Abnormalities?         No

## 2021-05-10 NOTE — PLAN OF CARE
Description: Discharged to appropriate level of care  5/10/2021 0551 by Josie Blanco RN  Outcome: Not Met This Shift     Problem: Airway Clearance - Ineffective:  Goal: Ability to maintain a clear airway will improve  Description: Ability to maintain a clear airway will improve  5/10/2021 0551 by Josie Blanco RN  Outcome: Not Met This Shift     Problem:  Bowel Function - Altered:  Goal: Bowel elimination is within specified parameters  Description: Bowel elimination is within specified parameters  5/10/2021 0551 by Josie Blanco RN  Outcome: Not Met This Shift     Problem: Cardiac Output - Decreased:  Goal: Hemodynamic stability will improve  Description: Hemodynamic stability will improve  5/10/2021 0551 by Josie Blanco RN  Outcome: Not Met This Shift     Problem: Fluid Volume - Imbalance:  Goal: Absence of imbalanced fluid volume signs and symptoms  Description: Absence of imbalanced fluid volume signs and symptoms  5/10/2021 0551 by Josie Blanco RN  Outcome: Not Met This Shift     Problem: Gas Exchange - Impaired:  Goal: Levels of oxygenation will improve  Description: Levels of oxygenation will improve  5/10/2021 0551 by Josie Blanco RN  Outcome: Not Met This Shift     Problem: Mental Status - Impaired:  Goal: Mental status will be restored to baseline  Description: Mental status will be restored to baseline  5/10/2021 0551 by Josie Blanco RN  Outcome: Not Met This Shift     Problem: Nutrition Deficit:  Goal: Ability to achieve adequate nutritional intake will improve  Description: Ability to achieve adequate nutritional intake will improve  5/10/2021 0551 by Josie Blanco RN  Outcome: Not Met This Shift     Problem: Pain:  Goal: Recognizes and communicates pain  Description: Recognizes and communicates pain  5/10/2021 0551 by Josie Blanco RN  Outcome: Not Met This Shift     Problem: Serum Glucose Level - Abnormal:  Goal: Ability to maintain appropriate

## 2021-05-10 NOTE — PROGRESS NOTES
New CRRT set being primed. Checking status of hemo access and the red port will not withdraw.   Minal notified 01:34

## 2021-05-10 NOTE — PROCEDURES
Arterial line placement    Procedure: Left radial arterial line placement. Indications: Continuous monitoring of blood pressure in a patient with hypotension +/- shock, on Levophed. Anesthesia: Local infiltration of 1% lidocaine. Consent: The family members were counseled regarding the procedure, its indications, risks, potential complications and alternatives, and any questions were answered. Consent was obtained to proceed. Technique: Time Out: Immediately prior to the procedure a \"timeout\" was called to verify the correct patient and procedure. Procedure was done using strict aseptic technique. Edd's test was performed and was normal. left radial site was cleaned with chloraprep and draped. Radial artery was identified, then Lidocaine 1% was infiltrated locally. Radial arterial line was inserted, a good blood flow was obtained, after which guidewire was inserted all the way with no resistance. Then the canula was inserted and needle with guidewire was withdrawn. Pulsatile bright red blood flow was observed. The canula was connected to BP monitoring apparatus and a good quality waveform was noted. Then the canula was secured with 2 stay sutures of 3-0 silk after Lidocaine infiltration, following which dressing was applied. Number of sticks: 2. Number of Kits used: 1. Complications: No immediate complication. Estimated blood loss: About 1 ml. Comment: Patient tolerated the procedure well. Vinita Penny MD PGY-1  5/9/2021 9:27 PM    Attending: Dr. Alvin Sheppard  Department of Pulmonary, Critical Care and [facility]  Department of Internal Medicine  Attending Procedural Note Addendum Statement    This procedure was supervised. The critical elements of this procedure was monitored and, if needed, I was available for the needs of the procedure.      Cassidy Stanford DO, FCCP, FACOI, FACP

## 2021-05-10 NOTE — PROGRESS NOTES
Coordination of care discussion and chart review with PM team.  Past medical history of alcohol abuse, polysubstance abuse, depression, chronic back pain, Clifton disease, hypothyroidism who presented to the ED at [facility] with Chief complaint altered mental status. Pt was unresponsive, surrounded by crack pipe, empty bottle of medications around. His sons Genet López and Freddy Verdugo are next of kin, pts mother is also listed. He is intubated in icu, unresponsive. Son spoke with NP, and is awaiting further information from neurology to discuss with family.

## 2021-05-10 NOTE — CARE COORDINATION
5/10 Care Coordination:Patient was found down by family member at home for unknown period of time. EMS was called, upon arrival, family was doing CPR but patient had a strong pulse. There was crack pipe, empty bottle of medications around. Patient is unresponsive to pain stimulus. Intubated and admit to MICU. On IV pressors. Started on CVVHD. Palliative Care working with sons for Code Status. CM/SW will continue to follow for discharge planning.    Wai BROWNN,RN-CV-BC  [phone]

## 2021-05-10 NOTE — PROGRESS NOTES
[address] [facility]  Department of Internal Medicine   Internal Medicine Residency   MICU Progress Note    Patient:  Francoise Campos 60 y.o. male  MRN: [id number]     Date of Service: 5/10/2021    Allergy: Patient has no known allergies. Subjective     Patient was seen and examined at bedside this morning. Patient is intubated and sedated with fentanyl. Patiently has no spontaneous movement, no withdrawal to noxious stimulation. Pupil fixed, unequal, nonreactive. Patient is breathing over the vent. Patient is on Levophed and Eugenio-Synephrine. MAP 60s  Vent settings AC/VC/16/450/80/80 percent FiO2. ABG 7.106/53/68/19  I/O: 3.7/130 (+13L)     24h change: Scleral edema with fluid overload, bicarb drip dropped to 100 cc/hour, patient is on CVVHD. MRI of the brain showing multiple infarct    Objective     VS: /73   Pulse 98   Temp 98.2 °F (36.8 °C) (Bladder)   Resp 20   Ht 6' (1.829 m)   Wt 159 lb 8 oz (72.3 kg)   SpO2 (!) 73%   BMI 21.63 kg/m²   ABP (Arterial line BP): 105/73  ABP mean (Arterial line mean): 86 mmHg    I & O - 24hr:     Intake/Output Summary (Last 24 hours) at 5/10/2021 1553  Last data filed at 5/10/2021 1500  Gross per 24 hour   Intake 77186 ml   Output 2941 ml   Net 8636 ml       Physical Exam:  · General Appearance: Intubated, sedated with fentanyl, unresponsive to noxious stimulation  · Neck: no adenopathy, no carotid bruit, no JVD, thyroid not enlarged, symmetric, no tenderness/mass/nodules and Neck soft tissue edema  · Lung: Coarse breath sound bilaterally  · Heart: regular rate and rhythm, S1, S2 normal, no murmur, click, rub or gallop  · Abdomen: soft, non-tender; bowel sounds normal; no masses,  no organomegaly and Several spider veins on the abdomen. · Extremities:  No edema  · Musculoskeletal: No joint swelling, no muscle tenderness.     · Neurologic: Mental status: Patient is unresponsive  to pain, pupils unequal, nonreactive, no gag, no cough reflex    Lines CREATININE 3.7 05/10/2021    CALCIUM 6.3 05/10/2021    GFRAA 21 05/10/2021    LABGLOM 17 05/10/2021    GLUCOSE 357 05/10/2021     Hepatic Function Panel:    Lab Results   Component Value Date    ALKPHOS 155 05/10/2021    ALT 4,015 05/10/2021    AST >7,000 05/10/2021    PROT 3.9 05/10/2021    BILITOT 0.7 05/10/2021    LABALBU 2.0 05/10/2021     Ionized Calcium:  No results found for: IONCA  Magnesium:    Lab Results   Component Value Date    MG 1.8 05/10/2021     Phosphorus:    Lab Results   Component Value Date    PHOS 7.1 05/10/2021     Warfarin PT/INR:  No components found for: PTPATWAR, PTINRWAR  PTT:    Lab Results   Component Value Date    APTT 26.7 05/09/2021   [APTT}  Last 3 Troponin:    Lab Results   Component Value Date    TROPONINI 0.63 05/09/2021    TROPONINI 0.31 05/09/2021     U/A:  No results found for: NITRITE, COLORU, PROTEINU, PHUR, LABCAST, WBCUA, RBCUA, MUCUS, TRICHOMONAS, YEAST, BACTERIA, CLARITYU, SPECGRAV, LEUKOCYTESUR, UROBILINOGEN, BILIRUBINUR, BLOODU, GLUCOSEU, AMORPHOUS  FLP:  No results found for: TRIG, HDL, LDLCALC, LDLDIRECT, LABVLDL  TSH:    Lab Results   Component Value Date    TSH 5.100 05/09/2021     VITAMIN B12: No components found for: B12  FOLATE:  No results found for: FOLATE  IRON:  No results found for: IRON  Iron Saturation:  No components found for: PERCENTFE    Imaging Studies:  CXR:   There is improvement of bilateral pulmonary interstitial infiltrate. Haziness at the left lung base likely reflects a layering pleural effusion. Cardiac and mediastinal contours are unremarkable.  Pulmonary vasculature is   unremarkable. Resident's Assessment and Plan     Savanah Eldridge is a 60 y.o. male with past medical history of alcohol abuse, polysubstance abuse, depression, chronic back pain, Clifton disease, hypothyroidism presented to the ED with chief complaint altered mental status. Patient is currently being managed for:      Assessment:     1.  Acute encephalopathy, multifactorial, 2/2 drug overdose versus multiple infarct, concern for herniation  2. Shock, cardiogenic shock 2/2 cardiac arrest vs septic shock, on Levophed, Eugenio-Synephrine and Vasopressin  3. ? Cardiac arrest  4. Acute hypoxic hypercapnic respiratory failure 2/2 drug OD versus new stroke  5. Acute liver failure 2/2 shock  6. SUSIE stage III, prerenal 2/2 hypotension versus intrinsic ATN, FeNa 1.2%, on CVVHD  7. Rhabdomyolysis  8. Thrombocytopenia, likely 2/2 sepsis   9. TCA overdose  10. Polysubstance abuse  11. 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 · Continue Levophed and Eugenio-Synephrine, monitor blood pressures. · Continue hydrocortisone 100 mg every 8 hour. Goal to keep MAP >65  · Continue Cefepime 2g q12h. · Monitor NICOM, follow cultures. Continue IV fluid with bicarb drip 100 cc/hour. · Following echo complete . Monitor electrolyte, goal keep Mg > 2, Phos > 2.5, K > 4   · Adjust vent setting: Increase RR 22, FiO2 100%. Repeat ABG afternoon. Breathing treatment with DuoNeb. · Continue NAC, monitor liver function every 8 hours. PT INR daily. · Continue CVVHD, monitor BMP q6h. · Monitor CK level q6h, on Bibarb drip 100 cc/hr  · Check peripheral blood smear. · Resume synthroid 25 mcg daily. · Thiamine and folate supplementation  · Neurology and nephrology is on board, further recs appreciated. · Monitor blood glucose 4 times a day, hypoglycemia protocol in place. Maverick Hughes MD, PGY-1    Attending physician: Dr. Aubrie Bolton    Addendum:  I have personally seen and examined patient. Reviewed from labs and radiographs independently. Patient had multiple organ failure. Appears to have severe anoxic encephalopathy. On clinical exam does not have any cranial reflexes. He is on multiple pressors to maintain a map above 65.   Also in liver shock with elevated liver enzymes. SUSIE and severe acidosis. Adjusted vent settings became to help with his respiratory acidosis. We will repeat an ABG in 2 hours. Will resume NAC. Continue CVVHD per nephrology recommendations. I talked extensively to patient's 2 sons at the bedside. Explained the prognosis at this point is very grim. They understand. We discussed CODE STATUS. At this point they still want to continue to be a full code although they will consider changing to a DNR CCA. I personally saw, examined and provided care for the patient. Radiographs, labs and medication list were reviewed by me independently. I spoke with bedside nursing, therapists and consultants. Critical care services and times documented are independent of procedures and multidisciplinary rounds with Residents. Additionally comprehensive, multidisciplinary rounds were conducted with the MICU team. The case was discussed in detail and plans for care were established. Review of Residents documentation was conducted and revisions were made as appropriate. I agree with the above documented exam, problem list and plan of care.   Clayton Reyna   CCT excluding procedures:45'

## 2021-05-10 NOTE — PROGRESS NOTES
Karina Means is a 60 y.o.  male     patient with a history of alcohol and polysubstance abuse, chronic back pain and Clifton disease admitted after being found unresponsive. Patient was found by family member after an unknown period of time. EMS called and upon arrival, family was doing CPR  EMS palpated a strong pulse. at the patient's side was a crack pipe and empty bottles of medications. (it is unclear which medications)    On arrival, CT showed multiple ischemic strokes both cerebral and cerebellar hemispheres. Found to be in rhabdo as well     Now receiving CVVHD - also multiple pressor drips and Fentanyl for sedation.    He is also with a warming blanket     No family present    Dr Edilia Spann assessment from yesterday noted       Objective:     /73   Pulse 96   Temp 95.5 °F (35.3 °C) (Bladder)   Resp 21   Ht 6' (1.829 m)   Wt 159 lb 8 oz (72.3 kg)   SpO2 (!) 73%   BMI 21.63 kg/m²      General appearance: not awake - not following commands   Extremities: dep edema appreciated   Pulses: 2+ and symmetric  Skin: no rashes or lesions    Mental Status: not awake - not following commands     Cranial Nerves:  I: smell    II: visual acuity     II: visual fields No threat   II: pupils Slight anisocoria - not reactive    III,VII: ptosis None   III,IV,VI: extraocular muscles  No Doll's   V: mastication    V: facial light touch sensation     V,VII: corneal reflex  Not present   VII: facial muscle function - upper     VII: facial muscle function - lower Normal   VIII: hearing    IX: soft palate elevation     IX,X: gag reflex Not present   XI: trapezius strength     XI: sternocleidomastoid strength    XI: neck extension strength     XII: tongue strength       No spontaneous movement   No withdrawal to noxious stimulation      DTR:   No reflexes    No Do's     Laboratory/Radiology:     CBC with Differential:    Lab Results   Component Value Date    WBC 23.1 05/10/2021    RBC 4.19 05/10/2021    HGB 13.8 05/10/2021    HCT 41.7 05/10/2021    PLT 94 05/10/2021    MCV 99.5 05/10/2021    MCH 32.9 05/10/2021    MCHC 33.1 05/10/2021    RDW 17.2 05/10/2021    NRBC 2.6 05/10/2021    METASPCT 7.8 05/10/2021    LYMPHOPCT 3.5 05/10/2021    MONOPCT 1.6 05/10/2021    MYELOPCT 1.7 05/10/2021    BASOPCT 0.2 05/10/2021    MONOSABS 0.00 05/10/2021    LYMPHSABS 0.92 05/10/2021    EOSABS 0.00 05/10/2021    BASOSABS 0.00 05/10/2021     CMP:    Lab Results   Component Value Date     05/10/2021    K 4.4 05/10/2021    CL 91 05/10/2021    CO2 18 05/10/2021    BUN 32 05/10/2021    CREATININE 3.3 05/10/2021    GFRAA 23 05/10/2021    LABGLOM 19 05/10/2021    GLUCOSE 283 05/10/2021    PROT 4.7 05/10/2021    LABALBU 2.3 05/10/2021    CALCIUM 7.1 05/10/2021    BILITOT 0.8 05/10/2021    ALKPHOS 152 05/10/2021    AST >7,000 05/10/2021    ALT 4,350 05/10/2021       CT Head:  1.  Multiple intracranial events. 2.  Late acute/early subacute nonhemorrhagic ischemic insults in the right  and left cerebellar hemispheres in vascular distribution of the  posteroinferior cerebellar arteries vascular territory. 2.  Focal areas of hypodensity in the globus pallidus bilaterally of  undetermined age. 3.  Discrete areas of hypodensity in the white matter of both cerebral  hemisphere in parietal region, can be a more chronic finding. 4.  Further evaluation with MRI of the brain with diffusion images  recommended the.  Also recommended is MRA of the neck and MRA of the head. The if needed with CTA's. I personally reviewed the patient's lab and imaging studies at this time.     Assessment:     bilateral cerebral hemispheric and cerebellar hemispheric infarctions   Most likely from hypotension s/p ?arrest/overdose    Patient with new neuro changes from yesterday to today - these may be from his multiple medical issues but cannot exclude an obtstructive hydrocephalus     Remains on Fentanyl however therefore exam unreliable but current demonstrates no brainstem/cerebral reflexes     History of alcohol and polysubstance abuse     Plan:     If able, repeat CT Head is in order    Suspected poor neurological porgnosis     Mckenna Hayden  10:09 AM  5/10/2021

## 2021-05-10 NOTE — CONSULTS
Vascular Surgery Consultation Note    Reason for Consult: Severe fluid overload in need of emergent vascular access for dialysis    HPI:    This is a 60 y.o. male who was found down in his home for what was believed to be at least 24 hours, surrounded by drug paraphernalia. Patient was taken to [facility].  Patient was positive for cocaine, TCA, and amphetamine. Patient was intubated and transferred to [facility].  Patient has significant rhabdomyolysis with a CK of approximately 30,000. Patient is in acute kidney failure, creatinine is 3.7. Patient has required large amounts of fluid and is on pressor support with Levophed and Magaly-Synephrine. Patient has severe fluid overload with scleral edema. CT of the brain was performed which did demonstrate cerebellar edema most likely from anoxic brain injury. Patient is nonresponsive to external stimuli. Vascular surgery is consulted for the placement of temporary hemodialysis catheter. ROS: Unable to assess as patient is nonresponsive      No past medical history on file. History reviewed. No pertinent surgical history.     Current Medications:    sodium chloride      fentaNYL 5 mcg/ml in 0.9%  ml infusion 200 mcg/hr (05/09/21 1536)    dextrose      norepinephrine 50 mcg/min (05/09/21 1950)    vasopressin (Septic Shock) infusion Stopped (05/09/21 1014)    sodium bicarbonate infusion 100 mL/hr at 05/09/21 1932    phenylephrine (MAGALY-SYNEPHRINE) 50mg/250mL infusion 300 mcg/min (05/09/21 1821)      sodium chloride flush, sodium chloride, promethazine **OR** ondansetron, polyethylene glycol, acetaminophen **OR** acetaminophen, perflutren lipid microspheres, ipratropium-albuterol, glucose, dextrose, glucagon (rDNA), dextrose, heparin (porcine)    sodium chloride flush  5-40 mL Intravenous 2 times per day    heparin (porcine)  5,000 Units Subcutaneous Q8H    cefepime  2,000 mg Intravenous Q12H    hydrocortisone sodium succinate PF  100 mg Intravenous W1L    folic acid  1 mg Intravenous Daily    pantoprazole  40 mg Intravenous BID    And    [START ON 5/10/2021] sodium chloride (PF)  10 mL Intravenous Daily    LORazepam  2 mg Intravenous Q6H    thiamine (VITAMIN B1) IVPB  500 mg Intravenous Q24H    acetylcysteine (ACETADOTE) infusion *second dose*  100 mg/kg Intravenous Once    insulin lispro  0-6 Units Subcutaneous Q4H    lactulose  20 g Oral TID    bumetanide  3 mg Intravenous Once    calcium gluconate IVPB  4,000 mg Intravenous Once    heparin (porcine)            Allergies:  Patient has no allergy information on record. Social History     Socioeconomic History    Marital status:      Spouse name: Not on file    Number of children: Not on file    Years of education: Not on file    Highest education level: Not on file   Occupational History    Not on file   Social Needs    Financial resource strain: Not on file    Food insecurity     Worry: Not on file     Inability: Not on file    Transportation needs     Medical: Not on file     Non-medical: Not on file   Tobacco Use    Smoking status: Not on file   Substance and Sexual Activity    Alcohol use: Not on file    Drug use: Not on file    Sexual activity: Not on file   Lifestyle    Physical activity     Days per week: Not on file     Minutes per session: Not on file    Stress: Not on file   Relationships    Social connections     Talks on phone: Not on file     Gets together: Not on file     Attends Yazidism service: Not on file     Active member of club or organization: Not on file     Attends meetings of clubs or organizations: Not on file     Relationship status: Not on file    Intimate partner violence     Fear of current or ex partner: Not on file     Emotionally abused: Not on file     Physically abused: Not on file     Forced sexual activity: Not on file   Other Topics Concern    Not on file   Social History Narrative    Not on file        No family history on file.     PHYSICAL EXAM: dose to as low as reasonably achievable. COMPARISON: None. HISTORY: ORDERING SYSTEM PROVIDED HISTORY: r/o CVA TECHNOLOGIST PROVIDED HISTORY: Reason for exam:->r/o CVA Has a \"code stroke\" or \"stroke alert\" been called? ->No What reading provider will be dictating this exam?->CRC FINDINGS: Presence of a large areas of hypodensity with geographical distribution in the more posteroinferior aspect of both cerebellar hemispheres. These relates with acute ischemic insult but more on late acute/early subacute phase in the vascular distribution of the right and left picas. There are also areas of hypodensity in the basal ganglia region bilaterally in the region of the globus pallidus, more noticeable on the left side which are of undetermined age. Additional areas of focal hypodensity in the white matter are seen in the subcortical region of the right and left posterior parietal lobes. Combination of these findings indicates the multiple different vascular territory of affecting both sides of the midline. Further evaluation with MRI study with diffusion images and with MRA of the neck and head are recommended. There is no midline shift. There is no indication for acute intracranial hemorrhagic event. Ventricular system correlates with the age group but there is a relative prominence of the peripheral CSF spaces over both cerebral convexities particularly in frontal parietal regions to be correlated clinically. There is no significant prominence of the peripheral CSF space in the Sylvian Fissures. No conspicuous acute intracranial hemorrhagic event is identified. Noted the is bilateral exophthalmos with prominence of the intra-orbital retro-globular fat planes. 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 Can correlate with the endocrine profile of the patient and with clinical data. At the time the present study patient is intubated with a oral tracheal tube and the nasogastric tube not covered on this study. Fluid accumulation in the left maxillary sinus and in the ethmoid air cells likely to be related with the intubation phase. 1.  Multiple intracranial events. 2.  Late acute/early subacute nonhemorrhagic ischemic insults in the right and left cerebellar hemispheres in vascular distribution of the posteroinferior cerebellar arteries vascular territory. 2.  Focal areas of hypodensity in the globus pallidus bilaterally of undetermined age. 3.  Discrete areas of hypodensity in the white matter of both cerebral hemisphere in parietal region, can be a more chronic finding. 4.  Further evaluation with MRI of the brain with diffusion images recommended the. Also recommended is MRA of the neck and MRA of the head. The if needed with CTA's. Ct Soft Tissue Neck Wo Contrast    Result Date: 5/9/2021  EXAMINATION: CT OF THE NECK WITHOUT CONTRAST  5/9/2021 TECHNIQUE: CT of the neck was performed without the administration of intravenous contrast. Multiplanar reformatted images are provided for review. Dose modulation, iterative reconstruction, and/or weight based adjustment of the mA/kV was utilized to reduce the radiation dose to as low as reasonably achievable. COMPARISON: None. HISTORY: ORDERING SYSTEM PROVIDED HISTORY: neck swelling r/o hematoma TECHNOLOGIST PROVIDED HISTORY: Reason for exam:->neck swelling r/o hematoma What reading provider will be dictating this exam?->CRC FINDINGS: Lung apices demonstrated the Summa emphysema changes. There is no pneumothorax. There is no subcutaneous soft tissue emphysema in the neck or towards the thoracic inlet area. The there is no indication for pneumomediastinum in the visualized upper segments of the mediastinum. Endotracheal tube tip is just proximal to the upper contour of the arch of the aorta. The NG tube is not covered on this study but is in the esophagus.  There is diffuse soft tissue swelling across the midline in the neck from the upper to the lower segments resp failure TECHNOLOGIST PROVIDED HISTORY: Reason for exam:->intubated, resp failure What reading provider will be dictating this exam?->CRC FINDINGS: Endotracheal tube is 5.8 cm above the solis. NG tube courses below the diaphragm. Interstitial prominence bilaterally. No focal airspace opacity or pleural effusion. The heart is normal in size. No pneumothorax. 1.  Endotracheal tube is 5.8 cm above the solis. 2.  NG tube courses below the diaphragm. 3.  Interstitial prominence bilaterally suggesting peribronchial inflammatory changes or pulmonary vascular congestion. Xr Abdomen For Ng/og/ne Tube Placement    Result Date: 5/9/2021  EXAMINATION: ONE SUPINE XRAY VIEW(S) OF THE ABDOMEN 5/9/2021 10:28 am COMPARISON: None. HISTORY: ORDERING SYSTEM PROVIDED HISTORY: Confirmation of course of NG/OG/NE tube and location of tip of tube TECHNOLOGIST PROVIDED HISTORY: Reason for exam:->Confirmation of course of NG/OG/NE tube and location of tip of tube Portable? ->Yes What reading provider will be dictating this exam?->CRC FINDINGS: The tip of the nasogastric tube projects over the body the stomach. There are scattered air-filled loops of small bowel in the mid abdomen. This finding is nonspecific but can be associated with ascites. There is interstitial prominence within the lungs that may represent mild pulmonary edema or fluid overload. No large effusions are observed. The abdomen appears unremarkable otherwise. There are no signs of abnormal calcifications or organomegaly. There are postoperative changes near the lumbosacral junction. 1. The position of the nasogastric tube is within normal range 2.  There are air-filled loops of small bowel noted centrally within the abdomen that is nonspecific but has been associated with ascites     Us Retroperitoneal Complete    Result Date: 5/9/2021  EXAMINATION: RETROPERITONEAL ULTRASOUND OF THE KIDNEYS AND URINARY BLADDER 5/9/2021 COMPARISON: None HISTORY: ORDERING SYSTEM PROVIDED HISTORY: abril TECHNOLOGIST PROVIDED HISTORY: Reason for exam:->abril What reading provider will be dictating this exam?->CRC FINDINGS: Kidneys: The right kidney measures 9.8 cm in length and the left kidney measures 8.8 cm in length. Kidneys demonstrate mildly increased cortical echogenicity. No evidence of hydronephrosis or intrarenal stones. Bladder: Bladder is decompressed with Garcia catheter in place. Slightly atrophic and mildly echogenic kidneys, suspicious for mild chronic renal parenchymal disease.      Assesment/Plan  60 y.o. male with acute renal failure after drug overdose, found down for prolonged period of time, rhabdomyolysis, cerebellar edema likely from anoxic brain injury    -MICU planning for CVVHD, and is in the need of vascular access  -We will place temporary hemodialysis catheter into the left femoral vein as right femoral vein has triple-lumen catheter in place  -Vascular surgery is available if more permanent dialysis access is required, please feel free to call with questions or concerns    Plan discussed with Dr. Morgan Garcia DO  5/9/21  8:41 PM ED    Pt seen and examined  Temp hd cath in place  Pleae call if needed for long term access    Arcelia Lane MD

## 2021-05-10 NOTE — PROCEDURES
Central Line Insertion     Procedure: right femoral vein Triple Lumen Catheter placement. Indications: centrally administered medications    Consent: The family members were counseled regarding the procedure, its indications, risks, potential complications and alternatives, and any questions were answered. Consent was obtained to proceed. Number of sticks: 1    Number of Kits used: 1    Procedure: Time Out: Immediately prior to the procedure a \"timeout\" was called to verify the correct patient and procedure. The patient was place in the trendelenburg position and the skin over the right femoral vein was prepped with betadine and draped in a sterile fashion. Local anesthesia was obtained by infiltration using 1% Lidocaine without epinephrine. With Ultrasound guidance a large bore needle was used to identify the vein, dark non pulsatile blood returned. The guide wire was then inserted through the needle with minimal resistance. 2 mm nick was made in the skin beside the guidewire. Then a dilator was inserted and removed. A triple lumen catheter was then inserted into the vessel over the guide wire using the Seldinger technique to the  15 cm meche. All ports showed good, free flowing blood return and were flushed with saline solution. The catheter was then securely fastened to the skin with sutures and covered with a bio patch and sterile dressing. A post procedure X-ray was ordered and showed good line position. Complications: None   The patient tolerated the procedure well. Estimated blood loss: 5 ml. Wolfgang Monge MD PGY-1  5/9/2021  9:28 PM      Attending: Dr. Jasmin Torres  Department of Pulmonary, Critical Care and [facility]  Department of Internal Medicine  Attending Procedural Note Addendum Statement    This procedure was supervised.  The critical elements of this procedure was monitored and, if needed, I was available for the needs of the procedure.      Boogie Mayo DO, DORIS, Paulino Covington

## 2021-05-10 NOTE — PLAN OF CARE
Problem: Aspiration:  Goal: Absence of aspiration  Description: Absence of aspiration  5/10/2021 1607 by Charmayne Lade, RN  Outcome: Met This Shift     Problem: Skin Integrity - Impaired:  Goal: Will show no infection signs and symptoms  Description: Will show no infection signs and symptoms  5/10/2021 1607 by Charmayne Lade, RN  Outcome: Met This Shift     Problem: Skin Integrity - Impaired:  Goal: Absence of new skin breakdown  Description: Absence of new skin breakdown  5/10/2021 1607 by Charmayne Lade, RN  Outcome: Met This Shift     Problem: Falls - Risk of:  Goal: Will remain free from falls  Description: Will remain free from falls  5/10/2021 1607 by Charmayne Lade, RN  Outcome: Met This Shift     Problem: Falls - Risk of:  Goal: Absence of physical injury  Description: Absence of physical injury  5/10/2021 1607 by Charmayne Lade, RN  Outcome: Met This Shift     Problem: Airway Clearance - Ineffective:  Goal: Ability to maintain a clear airway will improve  Description: Ability to maintain a clear airway will improve  5/10/2021 0834 by Charmayne Lade, RN  Outcome: Ongoing     Problem:  Bowel Function - Altered:  Goal: Bowel elimination is within specified parameters  Description: Bowel elimination is within specified parameters  5/10/2021 0834 by Charmayne Lade, RN  Outcome: Ongoing     Problem: Gas Exchange - Impaired:  Goal: Levels of oxygenation will improve  Description: Levels of oxygenation will improve  5/10/2021 1607 by Charmayne Lade, RN  Outcome: Ongoing     Problem: Mental Status - Impaired:  Goal: Mental status will be restored to baseline  Description: Mental status will be restored to baseline  5/10/2021 0834 by Charmayne Lade, RN  Outcome: Ongoing     Problem: Cardiac Output - Decreased:  Goal: Hemodynamic stability will improve  Description: Hemodynamic stability will improve  5/10/2021 1607 by Charmayne Lade, RN  Outcome: Not Met This Shift     Problem: Fluid Volume - Imbalance:  Goal: Absence of imbalanced fluid volume signs and symptoms  Description: Absence of imbalanced fluid volume signs and symptoms  5/10/2021 1607 by Venus Hernandez RN  Outcome: Not Met This Shift

## 2021-05-10 NOTE — PROGRESS NOTES
Hospitalist Progress Note      SYNOPSIS: Patient admitted on 2021     The patient is a 57 y. o. male with past medical history of alcohol abuse, polysubstance abuse, depression, chronic back pain, [place] disease, hypothyroidism presented to the ED with chief complaint altered mental status.  Patient was found down by family member at home for unknown period of time.  EMS was called, upon arrival, family was doing CPR but patient had a strong pulse. There was crack pipe, empty bottle of medications around. Patient is unresponsive to pain stimulus.  Patient was brought to the ED.  Patient was given Narcan 4 mg, Zofran 4 mg. He began responsive to pain but not regain consciousness. Lab work showed Potassium 8.5, sodium 133, creatinine 4.3, BUN 28, bicarb 16. AST 1015, , TSH 9.088, alcohol level 10. UDS positive for cocaine, TCA, amphetamine. EKG showed prolonged QRS, prolonged QTc. Troponin I 1418. Patient was intubated. He was given Calcium gluconate, Insulin NPH, Kayexalate,  and 1L normal saline. 1amp Bicarb.  CT scan showed bilateral cerebral cortical/subcortical hypodensity 3.7 to 4.8 cm on the left, mild edema, no midline shift, no cerebellar herniation. Patient was transferred to [facility] ICU for further management.  Patient was given 1 dose of vancomycin.     Upon arrival to the ICU, patient is intubated, vitals: Temperature 97.2, HR 80s, RR 24, blood pressures 106/64.  Patient has bilateral elbow pressure ulcer.        SUBJECTIVE:    Patient seen and examined  Records reviewed.    Remains intubated and sedated  White count 23,000    Temp (24hrs), Av °F (36.7 °C), Min:95.5 °F (35.3 °C), Max:99.7 °F (37.6 °C)    DIET: Diet NPO Effective Now  CODE: Full Code    Intake/Output Summary (Last 24 hours) at 5/10/2021 0950  Last data filed at 5/10/2021 0800  Gross per 24 hour   Intake 38747 ml   Output 2436 ml   Net 87599 ml       OBJECTIVE:    /73   Pulse 85   Temp 95.5 °F (35.3 °C) (Bladder) Resp 20   Ht 6' (1.829 m)   Wt 159 lb 8 oz (72.3 kg)   SpO2 (!) 73%   BMI 21.63 kg/m²     General appearance: Intubated and sedated  HEENT: Normocephalic, atraumatic  Neck: Supple. No jugular venous distention. Respiratory: Intubated and sedated  Cardiovascular: Regular rate rhythm, normal S1-S2  Abdomen: Soft, nontender, nondistended  Musculoskeletal: No clubbing, cyanosis, no bilateral lower extremity edema. Brisk capillary refill.    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  -Acute liver failure  -Rhabdomyolysis  -Lactic acidosis  -Acute kidney failure       PLAN:  -Neurology following  -Critical care following  -Vascular following  -Nephrology following  -MRI of the brain medically stable  -EEG  -Echocardiogram pending  -Continue ventilator support/wean as tolerated  -Levophed/vasopressin wean as able  -Continue cefepime  -Monitor cultures  -Hemodialysis per nephrology guidance    Medications:  REVIEWED DAILY    Infusion Medications    sodium chloride      fentaNYL 5 mcg/ml in 0.9%  ml infusion 200 mcg/hr (05/10/21 0338)    dextrose      norepinephrine 50 mcg/min (05/10/21 0544)    vasopressin (Septic Shock) infusion 0.03 Units/min (05/10/21 0855)    sodium bicarbonate infusion 100 mL/hr at 05/10/21 0658    phenylephrine (MAGALY-SYNEPHRINE) 50mg/250mL infusion 300 mcg/min (05/10/21 0615)    dialysis builder 1,000 mL/hr at 05/10/21 0300    prismaSol BGK 2/0 1,000 mL/hr (05/10/21 0300)    anticoagulant sodium citrate      And    calcium chloride CRRT infusion 65 mL/hr at 05/10/21 0358     Scheduled Medications    levothyroxine  25 mcg Oral Daily    sodium chloride flush  5-40 mL Intravenous 2 times per day    heparin (porcine)  5,000 Units Subcutaneous Q8H    cefepime  2,000 mg Intravenous Q12H    hydrocortisone sodium succinate PF  100 mg Intravenous N3E    folic acid  1 mg Intravenous Daily    pantoprazole  40 mg Intravenous BID    And    sodium chloride (PF)  10 mL Intravenous Daily    LORazepam  2 mg Intravenous Q6H    thiamine (VITAMIN B1) IVPB  500 mg Intravenous Q24H    acetylcysteine (ACETADOTE) infusion *second dose*  100 mg/kg Intravenous Once    insulin lispro  0-6 Units Subcutaneous Q4H    lactulose  20 g Oral TID     PRN Meds: sodium chloride flush, sodium chloride, promethazine **OR** ondansetron, polyethylene glycol, acetaminophen **OR** acetaminophen, perflutren lipid microspheres, ipratropium-albuterol, glucose, dextrose, glucagon (rDNA), dextrose, heparin (porcine), potassium chloride, magnesium sulfate, calcium gluconate **OR** calcium gluconate **OR** calcium gluconate **OR** calcium gluconate, sodium phosphate IVPB **OR** sodium phosphate IVPB **OR** sodium phosphate IVPB **OR** sodium phosphate IVPB    Labs:     Recent Labs     05/09/21  0609 05/10/21  0521   WBC 10.9 23.1*   HGB 14.3 13.8   HCT 44.4 41.7    94*       Recent Labs     05/09/21  0609 05/09/21  0609 05/09/21  1801 05/10/21  0214 05/10/21  0759     --  136 131* 131*   K 7.4*   < > 3.6 4.2 4.4     --  95* 87* 91*   CO2 18*  --  21* 16* 18*   BUN 31*  --  41* 41* 32*   CREATININE 3.3*  --  3.7* 4.2* 3.3*   CALCIUM 5.5*  --  5.9* 6.3* 7.1*   PHOS 8.4*  --   --  6.3* 7.2*    < > = values in this interval not displayed.        Recent Labs     05/09/21  1801 05/10/21  0214 05/10/21  0759   PROT 4.6* 4.4* 4.7*   ALKPHOS 92 111 152*   ALT 4,023* 4,200* 4,350*   AST >7,000* >7,000* >7,000*   BILITOT 0.4 0.5 0.8       Recent Labs     05/09/21  0609 05/10/21  0521   INR 1.4 2.6       Recent Labs     05/09/21  0609 05/09/21  1107 05/09/21  1801 05/09/21  2248 05/10/21  0521   CKTOTAL SEE BELOW* >22,000* see below* SEE BELOW* SEE BELOW*   TROPONINI 0.31* 0.63*  --   --   --        Chronic labs:    Lab Results   Component Value Date    TSH 5.100 (H) 05/09/2021    INR 2.6 05/10/2021    LABA1C 6.2 (H) 05/09/2021       Radiology: REVIEWED DAILY    +++++++++++++++++++++++++++++++++++++++++++++++++  [address], [place]  +++++++++++++++++++++++++++++++++++++++++++++++++  NOTE: This report was transcribed using voice recognition software. Every effort was made to ensure accuracy; however, inadvertent computerized transcription errors may be present.

## 2021-05-10 NOTE — CONSULTS
Palliative Care Department  [phone]  Palliative Care Initial Consult  Shu ZULUAGA-CNS, [facility]    Earline Kendrick  [id number]  Hospital Day: 2    Date of Initial Consult: 5/10/21  Referring Provider: Dr. Monica Massey was consulted for assistance with: goals of care and code status discussion. HPI:   Earline Kendrick is a 60 y.o. with a past medical history of alcohol abuse, polysubstance abuse, depression, chronic back pain, [place] disease, hypothyroidism who presented to the ED at [facility] with Chief complaint altered mental status.  Patient was found down by family member at home for unknown period of time.  EMS was called, upon arrival, family was doing CPR but patient had a strong pulse but unresponsive to pain. There was crack pipe, empty bottle of medications around. He was given Narcan 4 mg, Zofran 4 mg;became responsive to pain but not regain consciousness. Workup including labs- Potassium 8.5, sodium 133, creatinine 4.3, BUN 28, bicarb 16. AST 1015, , TSH 9.088, CK ~ 30K, alcohol level 10. UDS positive for cocaine, TCA, amphetamine. EKG showed prolonged QRS, prolonged QTc. Troponin 0.63. Patient was intubated; treated with Calcium gluconate, Insulin NPH, Kayexalate,  and 1L normal saline; Bicarb.  CT scan of brain showed bilateral cerebral cortical/subcortical hypodensity 3.7 to 4.8 cm on the left, mild edema, no midline shift, no cerebellar herniation. Patient was admitted on 5/9/21 and transferred to [facility] ICU for further [facility] was started on IVl fluids, required pressors. Neurology, Nephrology and Vascular are following. Pt required insertion of emergent vascular access for dialysis.      ASSESSMENT/PLAN:     Pertinent Hospital Diagnoses   · Acute encephalopathy, multifactorial, metabolic vs structural vs drug overdose; Neurology following; plan for EEG; MRI  · Acute hypoxic/hypercapnic respiratory failure-continue vent support; wean as able; nebulizers · Acute liver failure- monitor labs; continues on lactulose; acetylcysteine  · Cardiac arrest vs septic shock-continues on pressors  · Hyperkalemia- continues on  dialysis  · Rhabdomyolysis; monitor labs  · Acute kidney injury- dialysis      Palliative Care Encounter / Counseling Regarding Goals of Care   Cory Nunn, Does Not have capacity for medical decision-making. Capacity is time limited and situation/question specific   During encounter met with dominic Oropeza as surrogate medical decision-maker (in communication with other brother Shashi as well)   Outcome of goals of care meeting: continue current are; await results of EEG and neurology input; continue full aggressive care at present until more information is known   Code status Full Code; discussed options; reviewed details;    Advanced Directives: no known HPOA or Living Will noted in chart   Surrogate/Legal NOK: NOK- (oldest ) Son Cory Nunn @ [phone] but decisions made together with other Dominic Danielle  o Parent Brielle López @ [phone]  o [address] [place] [address] @ [phone]     Details of Conversation:   5/10 Pt remains intubated;sedated, on vent; FIO2 80%; SPO2 @ 73%. He continues on CRRT which is on hold at present. Requiring Levo and Eugenio at present-SBP . Pt has no gag; no corneal; not responding to voice or pain; no spontaneous movement. Has warming blanket in place. Met with dominic Oropeza; was just updated also by Dr. Rm Murphy. States he aware that pt has acute liver failure; swelling in brain ; kidney failure and other medical issues occurring. Explained role of Palliative Medicine. States he is not aware of any advance directives in place. States he is the eldest and has one brother so they plan to make decisions together. Has support from other family members as well. Updated on known information about pt; states he wants to wait for EEG and results and further input from Neurology before making a code status change.  We talked about the options and brochure was provided to him to review. Appreciative for updates and support. Contact information was provided to him if need to discuss further. Spoke with staff nurse. Spiritual assessment: no spiritual distress identified  Bereavement and grief: to be determined  Referrals to: none today    SUBJECTIVE:     Active Hospital Problems    Diagnosis Date Noted    Acute respiratory failure ([facility] [J96.00] 05/09/2021           OBJECTIVE:   Prognosis: Guarded    Physical Exam:  /73   Pulse 87   Temp 95.5 °F (35.3 °C) (Bladder)   Resp 19   Ht 6' (1.829 m)   Wt 159 lb 8 oz (72.3 kg)   SpO2 (!) 73%   BMI 21.63 kg/m²   Gen: Intubated; sedated on vent; not responsive  HEENT:  Normocephalic, atraumatic, mucosa moist, EOMI; right pupil not reactive  Neck:  Supple, trachea midline, no JVD  Lungs:  Intubated; on vent; diminished sounds, no audible rhonchi or wheezes noted, respirations unlabored  Heart:  NSR, RRR, distant heart tones, no murmur, rub, or gallop noted during exam  Abd:  Soft, non tender, non distended, bowel sounds present  :  Garcia catheter  Ext: tremors noted, no edema, pulses present  Skin:  Warm and dry  Neuro:  Intubated/ on vent; not following commands; gag absent    Past Medical History:   Diagnosis Date    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 No pertinent surgical history. Social History:   The patient currently lives at home  TOBACCO:  has an unknown smoking status. He has never used smokeless tobacco.  ETOH:  reports current alcohol use.     Objective data reviewed: labs, images, records, medication use, vitals and chart    Discussed patient and the plan of care with the other IDT members: Palliative Medicine IDT Team    Time/Communication  Greater than 50% of time spent, total 50

## 2021-05-10 NOTE — PROGRESS NOTES
Left femoral venous hemodialysis catheter exchange    Tegaderm dressing was removed. Area and catheter was prepped utilizing 2 chlorhexidine scrubs and sandwiching catheter between them both. Area was prepped sterile drapes. Blue port was opened and guidewire was passed through the blue port until within the left femoral vein. 14 Ukrainian 20 cm catheter was backed out over the guidewire and removed. Guidewire was held in place and a new temporary hemodialysis catheter 14 Ukrainian 24 cm in length was passed over the guidewire and inserted into the left femoral vein. Guidewire was removed. Ports were checked with sterile saline flushes. Both ports had fast withdrawal of blood and flushed easily. Catheter was sutured in place. Tegaderm was applied over the catheter. CVVHD was immediately started after placement of the new temporary hemodialysis catheter. Adequate flow was seen on the CVV HD machine.

## 2021-05-10 NOTE — PLAN OF CARE
Problem: Aspiration:  Goal: Absence of aspiration  Description: Absence of aspiration  5/10/2021 0834 by Yasmin Miles RN  Outcome: Met This Shift     Problem: Skin Integrity - Impaired:  Goal: Will show no infection signs and symptoms  Description: Will show no infection signs and symptoms  5/10/2021 0834 by Yasmin Miles RN  Outcome: Met This Shift     Problem: Skin Integrity - Impaired:  Goal: Absence of new skin breakdown  Description: Absence of new skin breakdown  5/10/2021 0834 by Yasmin Miles RN  Outcome: Met This Shift     Problem: Falls - Risk of:  Goal: Will remain free from falls  Description: Will remain free from falls  5/10/2021 0834 by Yasmin Miles RN  Outcome: Met This Shift     Problem: Falls - Risk of:  Goal: Absence of physical injury  Description: Absence of physical injury  5/10/2021 0834 by Yasmin Miles RN  Outcome: Met This Shift     Problem: Airway Clearance - Ineffective:  Goal: Ability to maintain a clear airway will improve  Description: Ability to maintain a clear airway will improve  5/10/2021 0834 by Yasmin Miles RN  Outcome: Ongoing     Problem:  Bowel Function - Altered:  Goal: Bowel elimination is within specified parameters  Description: Bowel elimination is within specified parameters  5/10/2021 0834 by Yasmin Miles RN  Outcome: Ongoing     Problem: Cardiac Output - Decreased:  Goal: Hemodynamic stability will improve  Description: Hemodynamic stability will improve  5/10/2021 0834 by Yasmin Miles RN  Outcome: Ongoing     Problem: Gas Exchange - Impaired:  Goal: Levels of oxygenation will improve  Description: Levels of oxygenation will improve  5/10/2021 0834 by Yasmin Miles RN  Outcome: Ongoing     Problem: Mental Status - Impaired:  Goal: Mental status will be restored to baseline  Description: Mental status will be restored to baseline  5/10/2021 0834 by Yasmin Miles RN  Outcome: Ongoing

## 2021-05-11 NOTE — PROGRESS NOTES
Nino Gaston is a 60 y.o.  male     patient with a history of alcohol and polysubstance abuse, chronic back pain and [place] disease admitted after being found unresponsive. Patient was found by family member after an unknown period of time. EMS called and upon arrival, family was doing CPR  EMS palpated a strong pulse. at the patient's side was a crack pipe and empty bottles of medications. (it is unclear which medications)  On arrival, CT showed multiple ischemic strokes both cerebral and cerebellar hemispheres. Found to be in rhabdo as well   Yesterday, his neuro exam appeared different than over weekend, therefore CT Head was discussed but unable to complete due to his marked medical instability   This remains the case -- CKs/liver enzymes continue to be elevated   Remains on CVVHD - also multiple pressor drips and Fentanyl for sedation.     He is also with a warming blanket with mottling     EEG obtained yesterday demonstrated no seizures - severe encephalopathy noted     No family present    Objective:     BP (!) 151/0   Pulse 96   Temp 94.8 °F (34.9 °C) (Bladder)   Resp 28   Ht 6' (1.829 m)   Wt 167 lb 3.2 oz (75.8 kg)   SpO2 (!) 82%   BMI 22.68 kg/m²      General appearance: not awake - not following commands   Extremities: dep edema appreciated   Pulses: 2+ and symmetric  Skin: no rashes or lesions    Mental Status: not awake - not following commands     Cranial Nerves:  I: smell    II: visual acuity     II: visual fields No threat   II: pupils Slight anisocoria - not reactive    III,VII: ptosis None   III,IV,VI: extraocular muscles  No Doll's   V: mastication    V: facial light touch sensation     V,VII: corneal reflex  Not present   VII: facial muscle function - upper     VII: facial muscle function - lower Normal   VIII: hearing    IX: soft palate elevation     IX,X: gag reflex Not present   XI: trapezius strength     XI: sternocleidomastoid strength    XI: neck extension strength     XII: tongue strength       No spontaneous movement   No withdrawal to noxious stimulation      DTR:   No reflexes    No Do's     Laboratory/Radiology:     CBC with Differential:    Lab Results   Component Value Date    WBC 18.9 05/11/2021    RBC 3.68 05/11/2021    HGB 12.3 05/11/2021    HCT 37.2 05/11/2021    PLT 35 05/11/2021    .1 05/11/2021    MCH 33.4 05/11/2021    MCHC 33.1 05/11/2021    RDW 17.2 05/11/2021    NRBC 4.4 05/11/2021    METASPCT 11.4 05/11/2021    LYMPHOPCT 1.8 05/11/2021    MONOPCT 6.1 05/11/2021    MYELOPCT 1.7 05/10/2021    BASOPCT 0.7 05/11/2021    MONOSABS 1.13 05/11/2021    LYMPHSABS 0.38 05/11/2021    EOSABS 0.00 05/11/2021    BASOSABS 0.00 05/11/2021     CMP:    Lab Results   Component Value Date     05/11/2021    K 4.10 05/11/2021    CL 92 05/11/2021    CO2 19 05/11/2021    BUN 17 05/11/2021    CREATININE 1.9 05/11/2021    GFRAA 44 05/11/2021    LABGLOM 37 05/11/2021    GLUCOSE 220 05/11/2021    PROT 3.7 05/11/2021    LABALBU 1.8 05/11/2021    CALCIUM 8.6 05/11/2021    BILITOT 1.4 05/11/2021    ALKPHOS 331 05/11/2021    AST >7,000 05/11/2021    ALT 4,771 05/11/2021       CT Head:  1.  Multiple intracranial events. 2.  Late acute/early subacute nonhemorrhagic ischemic insults in the right  and left cerebellar hemispheres in vascular distribution of the  posteroinferior cerebellar arteries vascular territory. 2.  Focal areas of hypodensity in the globus pallidus bilaterally of  undetermined age. 3.  Discrete areas of hypodensity in the white matter of both cerebral  hemisphere in parietal region, can be a more chronic finding. 4.  Further evaluation with MRI of the brain with diffusion images  recommended the.  Also recommended is MRA of the neck and MRA of the head. The if needed with CTA's. EEG 5/10/21  A severe nonspecific encephalopathy  No seizures or epileptiform discharges were noted during this study.      I personally reviewed the patient's lab and imaging studies at this time.     Assessment:     bilateral cerebral hemispheric and cerebellar hemispheric infarctions   Most likely from hypotension s/p ?arrest/overdose    Patient with new neuro changes yesterday - these may be from his multiple medical issues but cannot exclude an obtstructive hydrocephalus given his posterior infarctions     Remains on Fentanyl however therefore exam unreliable but current demonstrates no brainstem/cerebral reflexes     History of alcohol and polysubstance abuse     Plan:     If able, repeat CT Head - however he remains unstable medically     Suspected poor neurological porgnosis     Ulyess [address]  9:24 AM  5/11/2021

## 2021-05-11 NOTE — PROGRESS NOTES
Palliative Care Department  [phone]  Palliative Care Progress Note  Evangelina Cid  [id number]  Hospital Day: 3    Date of Initial Consult: 5/10/21  Referring Provider: Dr. Marcial Kelley was consulted for assistance with: goals of care and code status discussion. HPI:   Milan Young is a 60 y.o. with a past medical history of alcohol abuse, polysubstance abuse, depression, chronic back pain, [place] disease, hypothyroidism who presented to the ED at [facility] with Chief complaint altered mental status.  Patient was found down by family member at home for unknown period of time.  EMS was called, upon arrival, family was doing CPR but patient had a strong pulse but unresponsive to pain. There was crack pipe, empty bottle of medications around. He was given Narcan 4 mg, Zofran 4 mg;became responsive to pain but not regain consciousness. Workup including labs- Potassium 8.5, sodium 133, creatinine 4.3, BUN 28, bicarb 16. AST 1015, , TSH 9.088, CK ~ 30K, alcohol level 10. UDS positive for cocaine, TCA, amphetamine. EKG showed prolonged QRS, prolonged QTc. Troponin 0.63. Patient was intubated; treated with Calcium gluconate, Insulin NPH, Kayexalate,  and 1L normal saline; Bicarb.  CT scan of brain showed bilateral cerebral cortical/subcortical hypodensity 3.7 to 4.8 cm on the left, mild edema, no midline shift, no cerebellar herniation. Patient was admitted on 5/9/21 and transferred to [facility] ICU for further [facility] was started on IVl fluids, required pressors. Neurology, Nephrology and Vascular are following. Pt required insertion of emergent vascular access for dialysis.      ASSESSMENT/PLAN:     Pertinent Hospital Diagnoses   · Acute encephalopathy, multifactorial, metabolic vs structural vs drug overdose; Neurology following; plan for EEG; MRI  · Acute hypoxic/hypercapnic respiratory failure-continue vent support; wean as able; nebulizers  · Acute liver oz (75.8 kg)   SpO2 (!) 88%   BMI 22.68 kg/m²   Gen: Intubated; sedated on vent; not responsive  HEENT:  Normocephalic, atraumatic, mucosa moist, ETT in place  Neck:  trachea midline, no JVD  Lungs:  Intubated; mechanical respirations  Heart:   RRR, tachycardia  Abd:  Soft, non distended  :  Garcia catheter  Ext:  pulses present, edema  Skin:  Cyanotic discoloration of left hand noted  Neuro:  Intubated/ on vent; not following commands; gag/cough absent    Past Medical History:   Diagnosis Date    Clif[facility]'s [facility] ([facility]     Alcohol abuse     [facility] ([facility]     testicular, unknown year    Chronic back pain     Depression     Hypothyroid     Polysubstance abuse ([facility]     cocaine and crack     History reviewed. No pertinent surgical history. Social History:   The patient currently lives at home  TOBACCO:  has an unknown smoking status. He has never used smokeless tobacco.  ETOH:  reports current alcohol use. Objective data reviewed: labs, images, records, medication use, vitals and chart    Discussed patient and the plan of care with the other IDT members: Palliative Medicine IDT Team    Time/Communication  Greater than 50% of time spent, total 25 minutes in counseling and coordination of care at the bedside regarding goals of care, symptom management, diagnosis and prognosis and see above. Thank you for allowing Palliative Medicine to participate in the care of Selina Arevalo.

## 2021-05-11 NOTE — PROGRESS NOTES
Nephrology Progress Note  Patient's Name: Janice Jj  8:04 PM  5/10/2021        Reason for Consult: Acute kidney injury  Requesting Physician:  Kathy Blanca MD    Chief Complaint: Altered mental status  History Obtained From: SUSHIL VALERIO, patient sister    History of Present Ilness:    Janice Jj is a 60 y.o. male with a history of alcohol abuse, depression, hypothyroidism, Maries's disease and polysubstance abuse. Patient was found unresponsive at his home by family members for an unspecified duration. EMS was called; when they arrive CPR was in progress by family members. Drug paraphernalia was of found in the area including a crack pipe and empty medication bottles. He was treated with Narcan and Zofran but did not fully regain consciousness. His initial laboratory data was significant for a BUN of 28, creatinine 4.3, potassium 8.5, and elevated transaminases. TSH was also noted to be 9. Urine drug screen was positive for cocaine, TCA, and amphetamine. His alcohol level was noted to be 10. Patient was intubated. He was subsequently transferred to [address] [place] [address] ICU for further management. Repeat potassium level this morning was 7.4 and patient was noted to be progressively hypotensive. Initially he did have some urine output currently as of this morning he has been anuric. Repeat laboratory data showed persistent elevated serum potassium level of 7.4 and total CK was reported as greater than 22,000. He is requiring both Levophed and vasopressin to maintain blood pressure. Subjective    5/10: Noted to be less responsive this a.m. to stimuli MRI performed demonstrated areas of multiple infarcts; family at bedside visiting      Allergies:  Patient has no known allergies.     Current Medications:    levothyroxine (SYNTHROID) tablet 25 mcg, Daily  acetylcysteine 6,700 mg in dextrose 5 % 1,000 mL infusion, Continuous  chlorhexidine (PERIDEX) 0.12 % solution 15 mL, BID  insulin lispro (HUMALOG) injection vial 0-12 Units, Q4H  sodium chloride flush 0.9 % injection 5-40 mL, 2 times per day  sodium chloride flush 0.9 % injection 5-40 mL, PRN  0.9 % sodium chloride infusion, PRN  polyethylene glycol (GLYCOLAX) packet 17 g, Daily PRN  acetaminophen (TYLENOL) tablet 650 mg, Q6H PRN    Or  acetaminophen (TYLENOL) suppository 650 mg, Q6H PRN  perflutren lipid microspheres (DEFINITY) injection 1.65 mg, ONCE PRN  heparin (porcine) injection 5,000 Units, Q8H  ipratropium-albuterol (DUONEB) nebulizer solution 1 ampule, Q4H PRN  cefepime (MAXIPIME) 2000 mg IVPB minibag, Q12H  fentaNYL 5 mcg/ml in 0.9%  ml infusion, Continuous  glucose (GLUTOSE) 40 % oral gel 15 g, PRN  dextrose 50 % IV solution, PRN  glucagon (rDNA) injection 1 mg, PRN  dextrose 5 % solution, PRN  norepinephrine (LEVOPHED) 16 mg in dextrose 5% 250 mL infusion, Continuous  vasopressin 20 Units in dextrose 5 % 100 mL infusion, Continuous  sodium bicarbonate 150 mEq in dextrose 5 % 1,000 mL infusion, Continuous  hydrocortisone sodium succinate PF (SOLU-CORTEF) injection 370 mg, Z6S  folic acid injection 1 mg, Daily  pantoprazole (PROTONIX) injection 40 mg, BID    And  sodium chloride (PF) 0.9 % injection 10 mL, Daily  thiamine (B-1) 500 mg in sodium chloride 0.9 % 100 mL IVPB, Q24H  phenylephrine (MAGALY-SYNEPHRINE) 50 mg in dextrose 5 % 250 mL infusion, Continuous  lactulose (CHRONULAC) 10 GM/15ML solution 20 g, TID  heparin (porcine) injection 2,500 Units, PRN  prismaSATE BGK 4/0/1.2 5,000 mL solution, Continuous  prismaSol BGK 2/0 dialysis solution, Continuous  anticoagulant sodium citrate 4 GM/100ML solution, Continuous    And  calcium chloride 8,000 mg in sodium chloride 0.9 % 1,000 mL infusion, Continuous  potassium chloride 20 mEq/50 mL IVPB (Central Line), PRN  magnesium sulfate 1000 mg in dextrose 5% 100 mL IVPB, PRN  calcium gluconate 1000 mg in dextrose 5% 100 mL IVPB, PRN    Or  calcium gluconate 2,000 mg in dextrose 5 % 100 mL IVPB, PRN    Or creatinine); this is due to combination of rhabdomyolysis and hypotension induced ischemic ATN. Patient is anuric at this point.  -Spot urine for lytes and creatinine  -Bilateral renal ultrasound  -Aggressive fluid resuscitation  -CRRT initiated 5/9    2. Severe hyperkalemia due to acute kidney injury and metabolic acidosis  -Patient has received medical treatment for potassium most recent level on his blood gas was 4  -Continue to closely monitor    3. Multidrug overdose including cocaine and TCA    4. Acute hypoxic and hypercarbic respiratory failure  -Intubation with mechanical ventilation    5. Rhabdomyolysis  -Aggressive fluid resuscitation   -Monitor total CK levels closely    6. HAG metabolic acidosis due to SUSIE and lactic acidosis  -Continue bicarb drip for now    7.  Shock liver  -monitor LFTs    D/W Dr Dixie Becker MD  8:04 PM  5/10/2021

## 2021-05-11 NOTE — PLAN OF CARE
Problem: Anxiety/Stress:  Goal: Level of anxiety will decrease  Description: Level of anxiety will decrease  Outcome: Met This Shift     Problem: Aspiration:  Goal: Absence of aspiration  Description: Absence of aspiration  5/11/2021 0019 by Wendy Warner RN  Outcome: Met This Shift     Problem: Pain:  Goal: Pain level will decrease  Description: Pain level will decrease  Outcome: Met This Shift     Problem: Pain:  Goal: Control of acute pain  Description: Control of acute pain  Outcome: Met This Shift     Problem: Pain:  Goal: Control of chronic pain  Description: Control of chronic pain  Outcome: Met This Shift     Problem: Sleep Pattern Disturbance:  Goal: Appears well-rested  Description: Appears well-rested  Outcome: Met This Shift     Problem: Tissue Perfusion - Cardiopulmonary, Altered:  Goal: Absence of angina  Description: Absence of angina  Outcome: Met This Shift     Problem: Falls - Risk of:  Goal: Will remain free from falls  Description: Will remain free from falls  5/11/2021 0019 by Wendy Warner RN  Outcome: Met This Shift     Problem: Falls - Risk of:  Goal: Absence of physical injury  Description: Absence of physical injury  5/11/2021 0019 by Wendy Warner RN  Outcome: Met This Shift     Problem: Discharge Planning:  Goal: Participates in care planning  Description: Participates in care planning  Outcome: Not Met This Shift     Problem: Discharge Planning:  Goal: Discharged to appropriate level of care  Description: Discharged to appropriate level of care  Outcome: Not Met This Shift     Problem: Airway Clearance - Ineffective:  Goal: Ability to maintain a clear airway will improve  Description: Ability to maintain a clear airway will improve  Outcome: Not Met This Shift     Problem:  Bowel Function - Altered:  Goal: Bowel elimination is within specified parameters  Description: Bowel elimination is within specified parameters  Outcome: Not Met This Shift     Problem: Cardiac Output - Decreased:  Goal: Hemodynamic stability will improve  Description: Hemodynamic stability will improve  5/11/2021 0019 by Kevin Cabral RN  Outcome: Not Met This Shift     Problem: Fluid Volume - Imbalance:  Goal: Absence of imbalanced fluid volume signs and symptoms  Description: Absence of imbalanced fluid volume signs and symptoms  5/11/2021 0019 by Kevin Cabral RN  Outcome: Not Met This Shift     Problem: Gas Exchange - Impaired:  Goal: Levels of oxygenation will improve  Description: Levels of oxygenation will improve  5/11/2021 0019 by Kevin Cabral RN  Outcome: Not Met This Shift     Problem: Mental Status - Impaired:  Goal: Mental status will be restored to baseline  Description: Mental status will be restored to baseline  Outcome: Not Met This Shift     Problem: Nutrition Deficit:  Goal: Ability to achieve adequate nutritional intake will improve  Description: Ability to achieve adequate nutritional intake will improve  Outcome: Not Met This Shift     Problem: Serum Glucose Level - Abnormal:  Goal: Ability to maintain appropriate glucose levels will improve to within specified parameters  Description: Ability to maintain appropriate glucose levels will improve to within specified parameters  Outcome: Not Met This Shift     Problem: Skin Integrity - Impaired:  Goal: Will show no infection signs and symptoms  Description: Will show no infection signs and symptoms  5/11/2021 0019 by Kevin Cabral RN  Outcome: Not Met This Shift     Problem: Skin Integrity - Impaired:  Goal: Absence of new skin breakdown  Description: Absence of new skin breakdown  5/11/2021 0019 by Kevin Cabral RN  Outcome: Not Met This Shift     Problem: Tissue Perfusion, Altered:  Goal: Circulatory function within specified parameters  Description: Circulatory function within specified parameters  Outcome: Not Met This Shift     Problem: Tissue Perfusion - Cardiopulmonary, Altered:  Goal: Hemodynamic stability will improve Description: Hemodynamic stability will improve  Outcome: Not Met This Shift     Problem: Skin Integrity:  Goal: Will show no infection signs and symptoms  Description: Will show no infection signs and symptoms  Outcome: Not Met This Shift     Problem: Skin Integrity:  Goal: Absence of new skin breakdown  Description: Absence of new skin breakdown  Outcome: Not Met This Shift     Problem: Pain:  Goal: Recognizes and communicates pain  Description: Recognizes and communicates pain  Outcome: Completed

## 2021-05-11 NOTE — PROGRESS NOTES
GFRAA 50 05/11/2021    LABGLOM 42 05/11/2021    GLUCOSE 202 05/11/2021     Hepatic Function Panel:    Lab Results   Component Value Date    ALKPHOS 387 05/11/2021    ALT 4,683 05/11/2021    AST >7,000 05/11/2021    PROT 3.8 05/11/2021    BILITOT 1.5 05/11/2021    LABALBU 1.7 05/11/2021     Ionized Calcium:  No results found for: IONCA  Magnesium:    Lab Results   Component Value Date    MG 1.9 05/11/2021     Phosphorus:    Lab Results   Component Value Date    PHOS 5.6 05/11/2021     Warfarin PT/INR:  No components found for: PTPATWAR, PTINRWAR  PTT:    Lab Results   Component Value Date    APTT 26.7 05/09/2021   [APTT}  Last 3 Troponin:    Lab Results   Component Value Date    TROPONINI 0.63 05/09/2021    TROPONINI 0.31 05/09/2021     U/A:  No results found for: NITRITE, COLORU, PROTEINU, PHUR, LABCAST, WBCUA, RBCUA, MUCUS, TRICHOMONAS, YEAST, BACTERIA, CLARITYU, SPECGRAV, LEUKOCYTESUR, UROBILINOGEN, BILIRUBINUR, BLOODU, GLUCOSEU, AMORPHOUS  FLP:  No results found for: TRIG, HDL, LDLCALC, LDLDIRECT, LABVLDL  TSH:    Lab Results   Component Value Date    TSH 5.100 05/09/2021     VITAMIN B12: No components found for: B12  FOLATE:  No results found for: FOLATE  IRON:  No results found for: IRON  Iron Saturation:  No components found for: PERCENTFE    Imaging Studies:  CXR:   There is improvement of bilateral pulmonary interstitial infiltrate. Haziness at the left lung base likely reflects a layering pleural effusion. Cardiac and mediastinal contours are unremarkable.  Pulmonary vasculature is   unremarkable. Resident's Assessment and Plan     Mary Bain is a 60 y.o. male with past medical history of alcohol abuse, polysubstance abuse, depression, chronic back pain, [place] disease, hypothyroidism presented to the ED with chief complaint altered mental status. Patient is currently being managed for:      Assessment:     1.  Acute encephalopathy, multifactorial, 2/2 drug overdose versus multiple infarct, concern for herniation, worsening   2. Shock, cardiogenic shock 2/2 cardiac arrest vs septic shock, on Levophed, Eugenio-Synephrine and Vasopressin, worsening   3. ? Cardiac arrest  4. Acute hypoxic hypercapnic respiratory failure 2/2 drug OD versus new stroke  5. Acute liver failure 2/2 shock, worsening   6. SUSIE stage III, prerenal 2/2 hypotension versus intrinsic ATN, FeNa 1.2%, on CVVHD  7. Rhabdomyolysis, worsening   8. Thrombocytopenia, likely 2/2 sepsis   9. TCA overdose  10. Polysubstance abuse  11. Hypothyroidism, TSH 5.0, T4 0.91  12. Hx [place]'s disease     Plan:   · Family meeting with Palliative medicine, Neurology and Critical Care Team. Updated family about EEG results and poor prognosis and poor chance of any meaningful recovery. · Family would like to compassionately extubate patient later today when all family can convene after 6pm.   · Agreeable to change code status to [facility] until compassionate extubation can occur. Hawa Stafford DO, PGY-1    Attending physician: Dr. Monica Harden has decided to proceed with compassionate extubate and terminal wean this afternoon. CODE STATUS changed to DNR CCA.   Sil Durand MD

## 2021-05-11 NOTE — PROGRESS NOTES
05/11/21 1918   Oxygen Therapy/Pulse Ox   O2 Therapy Oxygen   O2 Device High flow nasal cannula   O2 Flow Rate (L/min) 15 L/min   FiO2  100 %   Resp 17   pt terminally extubated to nc at 15lpm

## 2021-05-11 NOTE — PLAN OF CARE
Problem: Aspiration:  Goal: Absence of aspiration  Description: Absence of aspiration  5/11/2021 5723 by Tasia Escobedo RN  Outcome: Met This Shift     Problem: Skin Integrity - Impaired:  Goal: Will show no infection signs and symptoms  Description: Will show no infection signs and symptoms  5/11/2021 0828 by Tasia Escobedo RN  Outcome: Met This Shift     Problem: Skin Integrity - Impaired:  Goal: Absence of new skin breakdown  Description: Absence of new skin breakdown  5/11/2021 0828 by Tasia Escobedo RN  Outcome: Met This Shift     Problem: Falls - Risk of:  Goal: Will remain free from falls  Description: Will remain free from falls  5/11/2021 0828 by Tasia Escobedo RN  Outcome: Met This Shift     Problem: Falls - Risk of:  Goal: Absence of physical injury  Description: Absence of physical injury  5/11/2021 0828 by Tasia Escobedo RN  Outcome: Met This Shift     Problem: Cardiac Output - Decreased:  Goal: Hemodynamic stability will improve  Description: Hemodynamic stability will improve  5/11/2021 0828 by Tasia Escobedo RN  Outcome: Ongoing     Problem: Fluid Volume - Imbalance:  Goal: Absence of imbalanced fluid volume signs and symptoms  Description: Absence of imbalanced fluid volume signs and symptoms  5/11/2021 0828 by Tasia Escobedo RN  Outcome: Ongoing     Problem: Gas Exchange - Impaired:  Goal: Levels of oxygenation will improve  Description: Levels of oxygenation will improve  5/11/2021 0828 by Tasia Escobedo RN  Outcome: Ongoing     Problem: Mental Status - Impaired:  Goal: Mental status will be restored to baseline  Description: Mental status will be restored to baseline  5/11/2021 0828 by Tasia Escobedo RN  Outcome: Not Met This Shift

## 2021-05-11 NOTE — PROGRESS NOTES
Hospitalist Progress Note      SYNOPSIS: Patient admitted on 2021     The patient is a 57 y. o. male with past medical history of alcohol abuse, polysubstance abuse, depression, chronic back pain, [place] disease, hypothyroidism presented to the ED with chief complaint altered mental status.  Patient was found down by family member at home for unknown period of time.  EMS was called, upon arrival, family was doing CPR but patient had a strong pulse. There was crack pipe, empty bottle of medications around. Patient is unresponsive to pain stimulus.  Patient was brought to the ED.  Patient was given Narcan 4 mg, Zofran 4 mg. He began responsive to pain but not regain consciousness. Lab work showed Potassium 8.5, sodium 133, creatinine 4.3, BUN 28, bicarb 16. AST 1015, , TSH 9.088, alcohol level 10. UDS positive for cocaine, TCA, amphetamine. EKG showed prolonged QRS, prolonged QTc. Troponin I 1418. Patient was intubated. He was given Calcium gluconate, Insulin NPH, Kayexalate,  and 1L normal saline. 1amp Bicarb.  CT scan showed bilateral cerebral cortical/subcortical hypodensity 3.7 to 4.8 cm on the left, mild edema, no midline shift, no cerebellar herniation. Patient was transferred to [facility] ICU for further management.  Patient was given 1 dose of vancomycin.     Upon arrival to the ICU, patient is intubated, vitals: Temperature 97.2, HR 80s, RR 24, blood pressures 106/64.  Patient has bilateral elbow pressure ulcer.        SUBJECTIVE:    Patient seen and examined  Records reviewed.    Remains intubated and sedated    Temp (24hrs), Av.9 °F (36.1 °C), Min:94.8 °F (34.9 °C), Max:98.8 °F (37.1 °C)    DIET: Diet NPO Effective Now  CODE: Full Code    Intake/Output Summary (Last 24 hours) at 2021 0942  Last data filed at 2021 0900  Gross per 24 hour   Intake 63302 ml   Output 9392 ml   Net 2743 ml       OBJECTIVE:    BP (!) 151/0   Pulse 96   Temp 94.8 °F (34.9 °C) (Bladder)   Resp 28   Ht 6' (1.829 m)   Wt 167 lb 3.2 oz (75.8 kg)   SpO2 (!) 82%   BMI 22.68 kg/m²     General appearance: Intubated and sedated  HEENT: Normocephalic, atraumatic  Neck: Supple. No jugular venous distention. Respiratory: Intubated and sedated  Cardiovascular: Regular rate rhythm, normal S1-S2  Abdomen: Soft, nontender, nondistended  Musculoskeletal: No clubbing, cyanosis, no bilateral lower extremity edema. Brisk capillary refill.    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  -Acute liver failure  -Rhabdomyolysis  -Lactic acidosis  -Acute kidney failure       PLAN:  -Neurology following  -Critical care following  -Vascular following  -Nephrology following  -Echocardiogram pending  -Continue ventilator support, wean as tolerated  -Levophed/vasopressin wean as able  -Continue cefepime  -Monitor cultures  -Hemodialysis per nephrology guidance  -Possible compassionate extubation later today    Medications:  REVIEWED DAILY    Infusion Medications    IV infusion builder 64.6 mL/hr at 05/10/21 2110    sodium chloride      fentaNYL 5 mcg/ml in 0.9%  ml infusion 200 mcg/hr (05/11/21 0840)    dextrose      norepinephrine 50 mcg/min (05/11/21 0521)    vasopressin (Septic Shock) infusion 0.03 Units/min (05/10/21 2330)    sodium bicarbonate infusion 150 mL/hr at 05/11/21 0714    phenylephrine (MAGALY-SYNEPHRINE) 50mg/250mL infusion Stopped (05/11/21 0657)    dialysis builder 1,200 mL/hr at 05/11/21 0702    prismaSol BGK 2/0 1,000 mL/hr (05/11/21 0500)    anticoagulant sodium citrate      And    calcium chloride CRRT infusion 8,000 mg (05/11/21 0851)     Scheduled Medications    levothyroxine  25 mcg Oral Daily    chlorhexidine  15 mL Mouth/Throat BID    insulin lispro  0-12 Units Subcutaneous Q4H    sodium chloride flush  5-40 mL Intravenous 2 times per day    heparin (porcine)  5,000 Units Subcutaneous Q8H    cefepime  2,000 mg Intravenous Q12H    hydrocortisone sodium succinate PF  100 mg Intravenous U6Z    folic acid  1 mg Intravenous Daily    pantoprazole  40 mg Intravenous BID    And    sodium chloride (PF)  10 mL Intravenous Daily    thiamine (VITAMIN B1) IVPB  500 mg Intravenous Q24H    lactulose  20 g Oral TID     PRN Meds: sodium chloride flush, sodium chloride, polyethylene glycol, acetaminophen **OR** acetaminophen, perflutren lipid microspheres, ipratropium-albuterol, glucose, dextrose, glucagon (rDNA), dextrose, heparin (porcine), potassium chloride, magnesium sulfate, calcium gluconate **OR** calcium gluconate **OR** calcium gluconate **OR** calcium gluconate, sodium phosphate IVPB **OR** sodium phosphate IVPB **OR** sodium phosphate IVPB **OR** sodium phosphate IVPB    Labs:     Recent Labs     05/09/21  0609 05/10/21  0521 05/11/21  0404   WBC 10.9 23.1* 18.9*   HGB 14.3 13.8 12.3*   HCT 44.4 41.7 37.2    94* 35*       Recent Labs     05/10/21  1950 05/11/21  0146 05/11/21  0758 05/11/21  0855   * 131* 131*  --    K 5.1* 4.5 4.3 4.10   CL 89* 89* 92*  --    CO2 18* 21* 19*  --    BUN 25* 20 17  --    CREATININE 3.0* 2.4* 1.9*  --    CALCIUM 7.5* 8.2* 8.6  --    PHOS 6.8* 6.8* 5.3*  --        Recent Labs     05/10/21  1950 05/11/21  0146 05/11/21  0758   PROT 4.4* 4.2* 3.7*   ALKPHOS 224* 276* 331*   ALT 4,616* 4,756* 4,771*   AST >7,000* >7,000* >7,000*   BILITOT 1.3* 1.4* 1.4*       Recent Labs     05/09/21  0609 05/10/21  0521 05/11/21  0404   INR 1.4 2.6 3.4       Recent Labs     05/09/21  0609 05/09/21  1107 05/09/21  1107 05/10/21  1721 05/10/21  2150 05/11/21  0404   CKTOTAL SEE BELOW* >22,000*   < > >22,000* SEE BELOW* SEE BELOW*   TROPONINI 0.31* 0.63*  --   --   --   --     < > = values in this interval not displayed.        Chronic labs:    Lab Results   Component Value Date    TSH 5.100 (H) 05/09/2021    INR 3.4 05/11/2021    LABA1C 6.2 (H) 05/09/2021       Radiology: REVIEWED DAILY    +++++++++++++++++++++++++++++++++++++++++++++++++  [address], [place]  +++++++++++++++++++++++++++++++++++++++++++++++++  NOTE: This report was transcribed using voice recognition software. Every effort was made to ensure accuracy; however, inadvertent computerized transcription errors may be present.

## 2021-05-11 NOTE — PROGRESS NOTES
Ok to extubate as per family's decision. Please call per usual protocol afterwards      Please call [facility] [phone] before planned compassionate extubation to ensure all donation potential is evaluated. This patient is NOT eligible for DCD (Donation after Circulatory Death) due to current clinical status.      Thank you

## 2021-05-11 NOTE — FLOWSHEET NOTE
Inpatient Wound Care (Initial consult) [id number] Date: 5/9/2021  4:33 AM    Reason for consult:  DTI    Significant history: Per H&P     History Of Present Illness:    Mr. Chaparro Jauregui, a 60y.o. year old male  who  has no past medical history on file.      The patient is a 57 y. o. male with past medical history of alcohol abuse, polysubstance abuse, depression, chronic back pain, [place] disease, hypothyroidism presented to the ED with chief complaint altered mental status.  Patient was found down by family member at home for unknown period of time.  EMS was called, upon arrival, family was doing CPR but patient had a strong pulse. There was crack pipe, empty bottle of medications around. Patient is unresponsive to pain stimulus.  Patient was brought to the ED.  Patient was given Narcan 4 mg, Zofran 4 mg. He began responsive to pain but not regain consciousness. Lab work showed Potassium 8.5, sodium 133, creatinine 4.3, BUN 28, bicarb 16. AST 1015, , TSH 9.088, alcohol level 10. UDS positive for cocaine, TCA, amphetamine. EKG showed prolonged QRS, prolonged QTc. Troponin I 1418. Patient was intubated. He was given Calcium gluconate, Insulin NPH, Kayexalate,  and 1L normal saline. 1amp Bicarb.  CT scan showed bilateral cerebral cortical/subcortical hypodensity 3.7 to 4.8 cm on the left, mild edema, no midline shift, no cerebellar herniation. Patient was transferred to [facility] ICU for further management.  Patient was given 1 dose of vancomycin.     Findings:    Right heel: red, purple discoloration     05/11/21 0940   Skin Integrity   Skin Integrity Bruising   Location right ankle   Skin Integrity Site 2   Skin Integrity Location 2   (dry scab)   Location 2   (right lower leg)   Skin Integrity Site 3   Skin Integrity Location 3   (dry crusty)    Location 3   (bilateral elbows)   Skin Integrity Site 4   Skin Integrity Location 4   (skin tear)   Location 4   (right arm)   Wound 05/09/21 Left 3rd finger   Date First Assessed/Time First Assessed: 05/09/21 2030   Present on Hospital Admission: Yes  Primary Wound Type: Other (comment)  Wound Description (Comments): Left 3rd finger   Wound Image    Dressing/Treatment Open to air; Pharmaceutical agent (see MAR)   Wound Length (cm) 1 cm   Wound Width (cm) 1 cm   Wound Depth (cm)   (unable to determine)   Wound Surface Area (cm^2) 1 cm^2   Change in Wound Size % (l*w) 0   Wound Assessment Dry   Drainage Amount None   Odor None   Julia-wound Assessment Intact   Wound Thickness Description not for Pressure Injury Partial thickness   Wound 05/10/21 Foot Left;Lateral   Date First Assessed/Time First Assessed: 05/10/21 0200   Present on Hospital Admission: Yes  Location: Foot  Wound Location Orientation: Left;Lateral   Wound Image    Wound Etiology Deep tissue/Injury   Dressing/Treatment Open to air   Wound Length (cm) 4 cm   Wound Width (cm) 2 cm   Wound Depth (cm)   (unable to determine)   Wound Surface Area (cm^2) 8 cm^2   Change in Wound Size % (l*w) -373.37   Wound Assessment Purple/maroon   Drainage Amount None   Julia-wound Assessment Intact   Wound 05/11/21 Ankle Left;Medial   Date First Assessed/Time First Assessed: 05/11/21 0957   Present on Hospital Admission: Yes  Location: Ankle  Wound Location Orientation: Left;Medial   Wound Image    Wound Etiology Deep tissue/Injury   Dressing/Treatment Open to air   Wound Length (cm) 6 cm   Wound Width (cm) 3 cm   Wound Depth (cm)   (unable to determine)   Wound Surface Area (cm^2) 18 cm^2   Wound Assessment Purple/maroon   Drainage Amount None   Odor None   Julia-wound Assessment Intact   Wound 05/11/21 Heel Left   Date First Assessed/Time First Assessed: 05/11/21 1005   Present on Hospital Admission: Yes  Location: Heel  Wound Location Orientation: Left   Wound Image    Wound Etiology Deep tissue/Injury   Dressing/Treatment Open to air   Wound Length (cm) 3 cm   Wound Width (cm) 3 cm   Wound Depth (cm)   (unable to determine)   Wound Surface Area (cm^2) 9 cm^2   Wound Assessment Purple/maroon   Drainage Amount None   Odor None   Julia-wound Assessment Intact       **Informed Consent**     photos taken of wounds and inserted into their chart as part of their permanent medical record for purposes of documentation, treatment management and/or medical review. All Images taken on 5/11/21 of patient name: Lucrecia Mcclain were transmitted and stored on secured [employer] located within [employer] by a registered [employer] Device. Impression:  Left heel: DTI  Left medial ankle: DTI  Left lateral foot: DTI  Left 3rd finger: partial thickness    Plan: Medix heel boots  Seat cushion for head  Bactroban: finger  TAP system  Patient will need continued preventative care.       Екатерина Scott 5/11/2021 10:10 AM

## 2021-05-11 NOTE — PLAN OF CARE
Family at bedside - updated by myself as well as palliative medicine - all questions answered    Poor prognosis conveyed by myself and palliative medicine

## 2021-05-11 NOTE — PROGRESS NOTES
Nephrology Progress Note  Patient's Name: Marcial Conte  10:03 AM  5/11/2021        Reason for Consult: Acute kidney injury  Requesting Physician:  Nixon Garcia MD    Chief Complaint: Altered mental status  History Obtained From: E HR, patient sister    History of Present Ilness:    Marcial Conte is a 60 y.o. male with a history of alcohol abuse, depression, hypothyroidism, [place]'s disease and polysubstance abuse. Patient was found unresponsive at his home by family members for an unspecified duration. EMS was called; when they arrive CPR was in progress by family members. Drug paraphernalia was of found in the area including a crack pipe and empty medication bottles. He was treated with Narcan and Zofran but did not fully regain consciousness. His initial laboratory data was significant for a BUN of 28, creatinine 4.3, potassium 8.5, and elevated transaminases. TSH was also noted to be 9. Urine drug screen was positive for cocaine, TCA, and amphetamine. His alcohol level was noted to be 10. Patient was intubated. He was subsequently transferred to [address] [place] [address] ICU for further management. Repeat potassium level this morning was 7.4 and patient was noted to be progressively hypotensive. Initially he did have some urine output currently as of this morning he has been anuric. Repeat laboratory data showed persistent elevated serum potassium level of 7.4 and total CK was reported as greater than 22,000. He is requiring both Levophed and vasopressin to maintain blood pressure. Subjective    5/10: Noted to be less responsive this a.m. to stimuli MRI performed demonstrated areas of multiple infarcts; family at bedside visiting  5/11: Persistent acidosis; continues to require large amount of pressors      Allergies:  Patient has no known allergies.     Current Medications:    levothyroxine (SYNTHROID) tablet 25 mcg, Daily  acetylcysteine 6,700 mg in dextrose 5 % 1,000 mL infusion, Continuous  chlorhexidine (PERIDEX) 0.12 % solution 15 mL, BID  insulin lispro (HUMALOG) injection vial 0-12 Units, Q4H  sodium chloride flush 0.9 % injection 5-40 mL, 2 times per day  sodium chloride flush 0.9 % injection 5-40 mL, PRN  0.9 % sodium chloride infusion, PRN  polyethylene glycol (GLYCOLAX) packet 17 g, Daily PRN  acetaminophen (TYLENOL) tablet 650 mg, Q6H PRN    Or  acetaminophen (TYLENOL) suppository 650 mg, Q6H PRN  perflutren lipid microspheres (DEFINITY) injection 1.65 mg, ONCE PRN  heparin (porcine) injection 5,000 Units, Q8H  ipratropium-albuterol (DUONEB) nebulizer solution 1 ampule, Q4H PRN  cefepime (MAXIPIME) 2000 mg IVPB minibag, Q12H  fentaNYL 5 mcg/ml in 0.9%  ml infusion, Continuous  glucose (GLUTOSE) 40 % oral gel 15 g, PRN  dextrose 50 % IV solution, PRN  glucagon (rDNA) injection 1 mg, PRN  dextrose 5 % solution, PRN  norepinephrine (LEVOPHED) 16 mg in dextrose 5% 250 mL infusion, Continuous  vasopressin 20 Units in dextrose 5 % 100 mL infusion, Continuous  sodium bicarbonate 150 mEq in dextrose 5 % 1,000 mL infusion, Continuous  hydrocortisone sodium succinate PF (SOLU-CORTEF) injection 297 mg, Y7O  folic acid injection 1 mg, Daily  pantoprazole (PROTONIX) injection 40 mg, BID    And  sodium chloride (PF) 0.9 % injection 10 mL, Daily  thiamine (B-1) 500 mg in sodium chloride 0.9 % 100 mL IVPB, Q24H  phenylephrine (MAGALY-SYNEPHRINE) 50 mg in dextrose 5 % 250 mL infusion, Continuous  lactulose (CHRONULAC) 10 GM/15ML solution 20 g, TID  heparin (porcine) injection 2,500 Units, PRN  prismaSATE BGK 4/0/1.2 5,000 mL solution, Continuous  prismaSol BGK 2/0 dialysis solution, Continuous  anticoagulant sodium citrate 4 GM/100ML solution, Continuous    And  calcium chloride 8,000 mg in sodium chloride 0.9 % 1,000 mL infusion, Continuous  potassium chloride 20 mEq/50 mL IVPB (Central Line), PRN  magnesium sulfate 1000 mg in dextrose 5% 100 mL IVPB, PRN  calcium gluconate 1000 mg in dextrose 5% 100 mL IVPB, PRN    Or calcium gluconate 2,000 mg in dextrose 5 % 100 mL IVPB, PRN    Or  calcium gluconate 3,000 mg in dextrose 5 % 100 mL IVPB, PRN    Or  calcium gluconate 4,000 mg in dextrose 5 % 100 mL IVPB, PRN  sodium phosphate 6 mmol in dextrose 5 % 250 mL IVPB, PRN    Or  sodium phosphate 12 mmol in dextrose 5 % 250 mL IVPB, PRN    Or  sodium phosphate 18 mmol in dextrose 5 % 500 mL IVPB, PRN    Or  sodium phosphate 24 mmol in dextrose 5 % 500 mL IVPB, PRN        Review of Systems:   Pertinent items are noted in HPI. Physical exam:  Vitals:    05/11/21 1000   BP:    Pulse: 102   Resp: 28   Temp:    SpO2: 91%           General: Intubated, sedated  Eyes: Some periorbital edema pupil not reactive  ENT: No discharge. Pharynx clear. Neck: Trachea midline. Normal thyroid. Lungs: Coarse breath sounds  CV: Regular rate. Regular rhythm. No murmur or rub. .   Abd:  Non-distended. No masses. No organmegaly. Normal bowel sounds. Skin: Warm and dry. No nodule on exposed extremities. No rash on exposed extremities.   Ext: No cyanosis, clubbing, edema   Neuro: Unresponsive       Data:   Labs:  Lab Results   Component Value Date     (L) 05/11/2021     (L) 05/11/2021     (L) 05/10/2021    K 4.10 05/11/2021    K 4.3 05/11/2021    K 4.5 05/11/2021    CL 92 (L) 05/11/2021    CO2 19 (L) 05/11/2021    CO2 21 (L) 05/11/2021    CO2 18 (L) 05/10/2021    CREATININE 1.9 (H) 05/11/2021    CREATININE 2.4 (H) 05/11/2021    CREATININE 3.0 (H) 05/10/2021    BUN 17 05/11/2021    BUN 20 05/11/2021    BUN 25 (H) 05/10/2021    GLUCOSE 220 (H) 05/11/2021    GLUCOSE 287 (H) 05/11/2021    GLUCOSE 262 (H) 05/10/2021    PHOS 5.3 (H) 05/11/2021    PHOS 6.8 (H) 05/11/2021    PHOS 6.8 (H) 05/10/2021    WBC 18.9 (H) 05/11/2021    WBC 23.1 (H) 05/10/2021    WBC 10.9 05/09/2021    HGB 12.3 (L) 05/11/2021    HGB 13.8 05/10/2021    HGB 14.3 05/09/2021    HCT 37.2 05/11/2021    HCT 41.7 05/10/2021    HCT 44.4 05/09/2021    .1 (H) 05/11/2021    PLT 35 (L) 05/11/2021         Imaging:  No results found. Assessment/Plans    1. Acute kidney injury stage III 3 (unknown baseline creatinine); this is due to combination of rhabdomyolysis and hypotension induced ischemic ATN. Patient is anuric at this point.  -Bilateral renal ultrasound demonstrated no hydronephrosis  -Aggressive fluid resuscitation; fluid balance now positive about 14 L  -CRRT initiated 5/9; plan is to maintain even fluid balance    2. Severe hyperkalemia due to acute kidney injury and metabolic acidosis on presentation  -Now resolved since start of CRRT  -Continue to closely monitor    3. Multidrug overdose including cocaine and TCA    4. Acute hypoxic and hypercarbic respiratory failure  -Intubation with mechanical ventilation    5. Rhabdomyolysis  -Despite aggressive fluid resuscitation CPK remains persistently elevated no improvement in renal function now requiring dialytic support  -Monitor total CK levels closely    6. HAG metabolic acidosis due to SUSIE and lactic acidosis; so hemodialysis catheter and he should let us know the last time I can open  -Continue bicarb drip for now    7.  Shock liver  -monitor LFTs    D/W Resident      Wing Fredy MD  10:03 AM  5/11/2021

## 2021-05-12 LAB
CULTURE, RESPIRATORY: NORMAL
SMEAR, RESPIRATORY: NORMAL

## 2021-05-12 NOTE — PLAN OF CARE
Problem: Discharge Planning:  Goal: Participates in care planning  Description: Participates in care planning  Outcome: Completed  Goal: Discharged to appropriate level of care  Description: Discharged to appropriate level of care  Outcome: Completed     Problem: Airway Clearance - Ineffective:  Goal: Ability to maintain a clear airway will improve  Description: Ability to maintain a clear airway will improve  Outcome: Completed     Problem: Anxiety/Stress:  Goal: Level of anxiety will decrease  Description: Level of anxiety will decrease  Outcome: Completed     Problem: Aspiration:  Goal: Absence of aspiration  Description: Absence of aspiration  5/11/2021 2101 by Prabha Mccarty RN  Outcome: Completed  5/11/2021 0828 by Charmayne Lade, RN  Outcome: Met This Shift     Problem:  Bowel Function - Altered:  Goal: Bowel elimination is within specified parameters  Description: Bowel elimination is within specified parameters  Outcome: Completed     Problem: Cardiac Output - Decreased:  Goal: Hemodynamic stability will improve  Description: Hemodynamic stability will improve  5/11/2021 2101 by Prabha Mccarty RN  Outcome: Completed  5/11/2021 0828 by Charmayne Lade, RN  Outcome: Ongoing     Problem: Fluid Volume - Imbalance:  Goal: Absence of imbalanced fluid volume signs and symptoms  Description: Absence of imbalanced fluid volume signs and symptoms  5/11/2021 2101 by Prabha Mccarty RN  Outcome: Completed  5/11/2021 0828 by Charmayne Lade, RN  Outcome: Ongoing     Problem: Gas Exchange - Impaired:  Goal: Levels of oxygenation will improve  Description: Levels of oxygenation will improve  5/11/2021 2101 by Prabha Mccarty RN  Outcome: Completed  5/11/2021 0828 by Charmayne Lade, RN  Outcome: Ongoing     Problem: Mental Status - Impaired:  Goal: Mental status will be restored to baseline  Description: Mental status will be restored to baseline  5/11/2021 2101 by Prabha Mccarty RN  Outcome: Completed  5/11/2021 0828 by Elysia Brumfield RN  Outcome: Not Met This Shift     Problem: Nutrition Deficit:  Goal: Ability to achieve adequate nutritional intake will improve  Description: Ability to achieve adequate nutritional intake will improve  Outcome: Completed     Problem: Pain:  Goal: Pain level will decrease  Description: Pain level will decrease  Outcome: Completed  Goal: Control of acute pain  Description: Control of acute pain  Outcome: Completed  Goal: Control of chronic pain  Description: Control of chronic pain  Outcome: Completed     Problem: Serum Glucose Level - Abnormal:  Goal: Ability to maintain appropriate glucose levels will improve to within specified parameters  Description: Ability to maintain appropriate glucose levels will improve to within specified parameters  Outcome: Completed     Problem: Skin Integrity - Impaired:  Goal: Will show no infection signs and symptoms  Description: Will show no infection signs and symptoms  5/11/2021 2101 by Kimberley Hinojosa RN  Outcome: Completed  5/11/2021 0828 by Elysia Brumfield RN  Outcome: Met This Shift  Goal: Absence of new skin breakdown  Description: Absence of new skin breakdown  5/11/2021 2101 by Kimberley Hinojosa RN  Outcome: Completed  5/11/2021 0828 by Elysia Brumfield RN  Outcome: Met This Shift     Problem: Sleep Pattern Disturbance:  Goal: Appears well-rested  Description: Appears well-rested  Outcome: Completed     Problem: Tissue Perfusion, Altered:  Goal: Circulatory function within specified parameters  Description: Circulatory function within specified parameters  Outcome: Completed     Problem: Tissue Perfusion - Cardiopulmonary, Altered:  Goal: Absence of angina  Description: Absence of angina  Outcome: Completed  Goal: Hemodynamic stability will improve  Description: Hemodynamic stability will improve  Outcome: Completed     Problem: Falls - Risk of:  Goal: Will remain free from falls  Description: Will remain free from falls  5/11/2021 2101 by Kimberley Hinojosa RN

## 2021-05-12 NOTE — DISCHARGE SUMMARY
Hospitalist Discharge Summary    Patient ID: Francoise Campos   Patient : 1965  Patient's PCP: Monie Sarmiento MD    Admit Date: 2021   Admitting Physician: Hermila Boland MD    Discharge Date:  2021   Discharge Physician: Kyler Brannon DO   Discharge Condition:   Discharge Disposition: [facility], [place], operated by [facility] course in brief:  (Please refer to daily progress notes for a comprehensive review of the hospitalization by requesting medical records)    Mr. Francoise Campos, a 60y.o. year old male  who  has no past medical history on file.      The patient is a 57 y. o. male with past medical history of alcohol abuse, polysubstance abuse, depression, chronic back pain, [place] disease, hypothyroidism presented to the ED with chief complaint altered mental status.  Patient was found down by family member at home for unknown period of time.  EMS was called, upon arrival, family was doing CPR but patient had a strong pulse. There was crack pipe, empty bottle of medications around. Patient is unresponsive to pain stimulus.  Patient was brought to the ED.  Patient was given Narcan 4 mg, Zofran 4 mg. He began responsive to pain but not regain consciousness. Lab work showed Potassium 8.5, sodium 133, creatinine 4.3, BUN 28, bicarb 16. AST 1015, , TSH 9.088, alcohol level 10. UDS positive for cocaine, TCA, amphetamine. EKG showed prolonged QRS, prolonged QTc. Troponin I 1418. Patient was intubated. He was given Calcium gluconate, Insulin NPH, Kayexalate,  and 1L normal saline.  1amp Bicarb.  CT scan showed bilateral cerebral cortical/subcortical hypodensity 3.7 to 4.8 cm on the left, mild edema, no midline shift, no cerebellar herniation. Patient was transferred to [facility] ICU for further management.  Patient was given 1 dose of vancomycin.     Upon arrival to the ICU, patient is intubated, vitals: Temperature 97.2, HR 80s, RR 24, blood pressures 106/64.  Patient has bilateral elbow pressure ulcer.  Ventricular system correlates with the age group but there is a relative prominence of the peripheral CSF spaces over both cerebral convexities particularly in frontal parietal regions to be correlated clinically. There is no significant prominence of the peripheral CSF space in the Sylvian Fissures. No conspicuous acute intracranial hemorrhagic event is identified. Noted the is bilateral exophthalmos with prominence of the intra-orbital retro-globular fat planes. 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 Can correlate with the endocrine profile of the patient and with clinical data. At the time the present study patient is intubated with a oral tracheal tube and the nasogastric tube not covered on this study. Fluid accumulation in the left maxillary sinus and in the ethmoid air cells likely to be related with the intubation phase. 1.  Multiple intracranial events. 2.  Late acute/early subacute nonhemorrhagic ischemic insults in the right and left cerebellar hemispheres in vascular distribution of the posteroinferior cerebellar arteries vascular territory. 2.  Focal areas of hypodensity in the globus pallidus bilaterally of undetermined age. 3.  Discrete areas of hypodensity in the white matter of both cerebral hemisphere in parietal region, can be a more chronic finding. 4.  Further evaluation with MRI of the brain with diffusion images recommended the. Also recommended is MRA of the neck and MRA of the head. The if needed with CTA's. Ct Soft Tissue Neck Wo Contrast    Result Date: 5/9/2021  EXAMINATION: CT OF THE NECK WITHOUT CONTRAST  5/9/2021 TECHNIQUE: CT of the neck was performed without the administration of intravenous contrast. Multiplanar reformatted images are provided for review. Dose modulation, iterative reconstruction, and/or weight based adjustment of the mA/kV was utilized to reduce the radiation dose to as low as reasonably achievable. COMPARISON: None.  HISTORY: ORDERING SYSTEM PROVIDED HISTORY: neck swelling r/o hematoma TECHNOLOGIST PROVIDED HISTORY: Reason for exam:->neck swelling r/o hematoma What reading provider will be dictating this exam?->CRC FINDINGS: Lung apices demonstrated the Summa emphysema changes. There is no pneumothorax. There is no subcutaneous soft tissue emphysema in the neck or towards the thoracic inlet area. The there is no indication for pneumomediastinum in the visualized upper segments of the mediastinum. Endotracheal tube tip is just proximal to the upper contour of the arch of the aorta. The NG tube is not covered on this study but is in the esophagus. There is diffuse soft tissue swelling across the midline in the neck from the upper to the lower segments particularly at and below the level of the hyoid bone towards the thoracic inlet. No sizable cervical adenopathy is being observed. There is some fluid accumulation in the pharyngeal area which correspond to the prevertebral region which makes difficult to evaluate for prevertebral soft tissue swelling. There is no conspicuous free fluid accumulation in the prevertebral area. The fat planes anterior to the longus reji muscles up appears preserved bilaterally. The anasarca or diffuse subcutaneous soft tissue edema is seen in the subcutaneous soft tissues anterior and posteriorly in multiple compartments of the neck above and below the hyoid bone although predominant inferior to the hyoid bone. It is also seen along the right and left carotid spaces and surrounding the submandibular glands. Cannot conspicuously identified the a origin for these soft tissue swelling. Correlation with clinical data is recommended the. Due the areas of ongoing ischemic insults in the posterior fossa it was recommended correlation with MRI of the brain and MRA of the head and neck which will allow evaluation of the carotid system of the neck Can also complement with CTA of the neck for arterial and venous phase. The thyroid is surrounded by edema others appears to be intrinsically unremarkable. Degenerative changes are seen in the C5-6 and C6-7 levels of the lumbar spine. Facet joints are well-aligned. There is an old avulsion of the spinous process of T1. The odontoid process is intact. The visualized bones of the face have unremarkable appearance. . .     1. Diffuse soft tissue swelling/anasarca in the neck as above commented. Cannot conspicuously identified the a cause for etiology. 2.  Correlation with clinical data is needed. 3.  See above comments. Xr Chest Portable    Result Date: 5/10/2021  EXAMINATION: ONE XRAY VIEW OF THE CHEST 5/10/2021 11:14 am COMPARISON: Comparison is dated May 9, 2021 HISTORY: ORDERING SYSTEM PROVIDED HISTORY: intubated, resp failure TECHNOLOGIST PROVIDED HISTORY: Reason for exam:->intubated, resp failure What reading provider will be dictating this exam?->CRC FINDINGS: ET and OG tubes remain in satisfactory position There is improvement of bilateral pulmonary interstitial infiltrate. Haziness at the left lung base likely reflects a layering pleural effusion. Cardiac and mediastinal contours are unremarkable. Pulmonary vasculature is unremarkable. Probable small layering left pleural effusion ET and OG tubes remain in satisfactory position Improved interstitial infiltrates     Xr Chest Portable    Result Date: 5/9/2021  EXAMINATION: ONE XRAY VIEW OF THE CHEST 5/9/2021 10:28 am COMPARISON: None. HISTORY: ORDERING SYSTEM PROVIDED HISTORY: intubated, resp failure TECHNOLOGIST PROVIDED HISTORY: Reason for exam:->intubated, resp failure What reading provider will be dictating this exam?->CRC FINDINGS: Endotracheal tube is 5.8 cm above the solis. NG tube courses below the diaphragm. Interstitial prominence bilaterally. No focal airspace opacity or pleural effusion. The heart is normal in size. No pneumothorax. 1.  Endotracheal tube is 5.8 cm above the solis.  2.  NG tube Slightly atrophic and mildly echogenic kidneys, suspicious for mild chronic renal parenchymal disease. Discharge Medications:      Medication List      You have not been prescribed any medications. Time Spent on discharge is more than 45 minutes in the examination, evaluation, counseling and review of medications and discharge plan.    +++++++++++++++++++++++++++++++++++++++++++++++++  [address], [place]  +++++++++++++++++++++++++++++++++++++++++++++++++  NOTE: This report was transcribed using voice recognition software. Every effort was made to ensure accuracy; however, inadvertent computerized transcription errors may be present.

## 2021-05-14 LAB
BLOOD CULTURE, ROUTINE: NORMAL
CULTURE, BLOOD 2: NORMAL